# Patient Record
Sex: MALE | Race: ASIAN | NOT HISPANIC OR LATINO
[De-identification: names, ages, dates, MRNs, and addresses within clinical notes are randomized per-mention and may not be internally consistent; named-entity substitution may affect disease eponyms.]

---

## 2017-04-14 ENCOUNTER — NON-APPOINTMENT (OUTPATIENT)
Age: 82
End: 2017-04-14

## 2017-04-14 ENCOUNTER — APPOINTMENT (OUTPATIENT)
Dept: ELECTROPHYSIOLOGY | Facility: CLINIC | Age: 82
End: 2017-04-14

## 2017-04-14 VITALS — SYSTOLIC BLOOD PRESSURE: 152 MMHG | DIASTOLIC BLOOD PRESSURE: 67 MMHG | HEART RATE: 78 BPM

## 2017-05-12 ENCOUNTER — OUTPATIENT (OUTPATIENT)
Dept: OUTPATIENT SERVICES | Facility: HOSPITAL | Age: 82
LOS: 1 days | End: 2017-05-12
Payer: MEDICARE

## 2017-05-12 PROCEDURE — 75574 CT ANGIO HRT W/3D IMAGE: CPT | Mod: 26

## 2017-05-12 PROCEDURE — 75574 CT ANGIO HRT W/3D IMAGE: CPT

## 2017-07-28 ENCOUNTER — APPOINTMENT (OUTPATIENT)
Dept: ELECTROPHYSIOLOGY | Facility: CLINIC | Age: 82
End: 2017-07-28

## 2017-11-03 ENCOUNTER — NON-APPOINTMENT (OUTPATIENT)
Age: 82
End: 2017-11-03

## 2017-11-03 ENCOUNTER — APPOINTMENT (OUTPATIENT)
Dept: ELECTROPHYSIOLOGY | Facility: CLINIC | Age: 82
End: 2017-11-03
Payer: MEDICARE

## 2017-11-03 VITALS — HEART RATE: 60 BPM | SYSTOLIC BLOOD PRESSURE: 134 MMHG | DIASTOLIC BLOOD PRESSURE: 79 MMHG | OXYGEN SATURATION: 94 %

## 2017-11-03 PROCEDURE — 93280 PM DEVICE PROGR EVAL DUAL: CPT

## 2018-01-06 ENCOUNTER — INPATIENT (INPATIENT)
Facility: HOSPITAL | Age: 83
LOS: 4 days | Discharge: HOME HEALTH | DRG: 287 | End: 2018-01-11
Attending: INTERNAL MEDICINE | Admitting: INTERNAL MEDICINE
Payer: MEDICARE

## 2018-01-06 VITALS
SYSTOLIC BLOOD PRESSURE: 159 MMHG | OXYGEN SATURATION: 97 % | RESPIRATION RATE: 18 BRPM | HEART RATE: 85 BPM | HEIGHT: 68 IN | TEMPERATURE: 98 F | WEIGHT: 214.95 LBS | DIASTOLIC BLOOD PRESSURE: 82 MMHG

## 2018-01-06 DIAGNOSIS — I10 ESSENTIAL (PRIMARY) HYPERTENSION: ICD-10-CM

## 2018-01-06 DIAGNOSIS — R63.8 OTHER SYMPTOMS AND SIGNS CONCERNING FOOD AND FLUID INTAKE: ICD-10-CM

## 2018-01-06 DIAGNOSIS — R74.8 ABNORMAL LEVELS OF OTHER SERUM ENZYMES: ICD-10-CM

## 2018-01-06 DIAGNOSIS — F32.9 MAJOR DEPRESSIVE DISORDER, SINGLE EPISODE, UNSPECIFIED: ICD-10-CM

## 2018-01-06 DIAGNOSIS — Z29.9 ENCOUNTER FOR PROPHYLACTIC MEASURES, UNSPECIFIED: ICD-10-CM

## 2018-01-06 DIAGNOSIS — I50.9 HEART FAILURE, UNSPECIFIED: ICD-10-CM

## 2018-01-06 DIAGNOSIS — I48.91 UNSPECIFIED ATRIAL FIBRILLATION: ICD-10-CM

## 2018-01-06 DIAGNOSIS — I27.20 PULMONARY HYPERTENSION, UNSPECIFIED: ICD-10-CM

## 2018-01-06 DIAGNOSIS — E78.5 HYPERLIPIDEMIA, UNSPECIFIED: ICD-10-CM

## 2018-01-06 DIAGNOSIS — E11.9 TYPE 2 DIABETES MELLITUS WITHOUT COMPLICATIONS: ICD-10-CM

## 2018-01-06 LAB
ALBUMIN SERPL ELPH-MCNC: 4.5 G/DL — SIGNIFICANT CHANGE UP (ref 3.3–5)
ALP SERPL-CCNC: 43 U/L — SIGNIFICANT CHANGE UP (ref 40–120)
ALT FLD-CCNC: 12 U/L — SIGNIFICANT CHANGE UP (ref 10–45)
ANION GAP SERPL CALC-SCNC: 13 MMOL/L — SIGNIFICANT CHANGE UP (ref 5–17)
APTT BLD: 40.6 SEC — HIGH (ref 27.5–37.4)
AST SERPL-CCNC: 25 U/L — SIGNIFICANT CHANGE UP (ref 10–40)
BASOPHILS NFR BLD AUTO: 0.2 % — SIGNIFICANT CHANGE UP (ref 0–2)
BILIRUB SERPL-MCNC: 0.7 MG/DL — SIGNIFICANT CHANGE UP (ref 0.2–1.2)
BUN SERPL-MCNC: 19 MG/DL — SIGNIFICANT CHANGE UP (ref 7–23)
CALCIUM SERPL-MCNC: 9.3 MG/DL — SIGNIFICANT CHANGE UP (ref 8.4–10.5)
CHLORIDE SERPL-SCNC: 102 MMOL/L — SIGNIFICANT CHANGE UP (ref 96–108)
CO2 SERPL-SCNC: 26 MMOL/L — SIGNIFICANT CHANGE UP (ref 22–31)
CREAT SERPL-MCNC: 1.09 MG/DL — SIGNIFICANT CHANGE UP (ref 0.5–1.3)
EOSINOPHIL NFR BLD AUTO: 1 % — SIGNIFICANT CHANGE UP (ref 0–6)
GLUCOSE SERPL-MCNC: 118 MG/DL — HIGH (ref 70–99)
HCT VFR BLD CALC: 44.5 % — SIGNIFICANT CHANGE UP (ref 39–50)
HGB BLD-MCNC: 14.5 G/DL — SIGNIFICANT CHANGE UP (ref 13–17)
INR BLD: 1.56 — HIGH (ref 0.88–1.16)
LYMPHOCYTES # BLD AUTO: 19.4 % — SIGNIFICANT CHANGE UP (ref 13–44)
MCHC RBC-ENTMCNC: 30.6 PG — SIGNIFICANT CHANGE UP (ref 27–34)
MCHC RBC-ENTMCNC: 32.6 G/DL — SIGNIFICANT CHANGE UP (ref 32–36)
MCV RBC AUTO: 93.9 FL — SIGNIFICANT CHANGE UP (ref 80–100)
MONOCYTES NFR BLD AUTO: 11.7 % — SIGNIFICANT CHANGE UP (ref 2–14)
NEUTROPHILS NFR BLD AUTO: 67.7 % — SIGNIFICANT CHANGE UP (ref 43–77)
NT-PROBNP SERPL-SCNC: 777 PG/ML — HIGH (ref 0–300)
PLATELET # BLD AUTO: 147 K/UL — LOW (ref 150–400)
POTASSIUM SERPL-MCNC: 5.3 MMOL/L — SIGNIFICANT CHANGE UP (ref 3.5–5.3)
POTASSIUM SERPL-SCNC: 5.3 MMOL/L — SIGNIFICANT CHANGE UP (ref 3.5–5.3)
PROT SERPL-MCNC: 7.8 G/DL — SIGNIFICANT CHANGE UP (ref 6–8.3)
PROTHROM AB SERPL-ACNC: 17.5 SEC — HIGH (ref 9.8–12.7)
RBC # BLD: 4.74 M/UL — SIGNIFICANT CHANGE UP (ref 4.2–5.8)
RBC # FLD: 14.2 % — SIGNIFICANT CHANGE UP (ref 10.3–16.9)
SODIUM SERPL-SCNC: 141 MMOL/L — SIGNIFICANT CHANGE UP (ref 135–145)
WBC # BLD: 5.1 K/UL — SIGNIFICANT CHANGE UP (ref 3.8–10.5)
WBC # FLD AUTO: 5.1 K/UL — SIGNIFICANT CHANGE UP (ref 3.8–10.5)

## 2018-01-06 PROCEDURE — 99285 EMERGENCY DEPT VISIT HI MDM: CPT | Mod: 25

## 2018-01-06 PROCEDURE — 71045 X-RAY EXAM CHEST 1 VIEW: CPT | Mod: 26

## 2018-01-06 PROCEDURE — 93010 ELECTROCARDIOGRAM REPORT: CPT

## 2018-01-06 RX ORDER — METOPROLOL TARTRATE 50 MG
1 TABLET ORAL
Qty: 0 | Refills: 0 | COMMUNITY

## 2018-01-06 RX ORDER — ATORVASTATIN CALCIUM 80 MG/1
10 TABLET, FILM COATED ORAL AT BEDTIME
Qty: 0 | Refills: 0 | Status: DISCONTINUED | OUTPATIENT
Start: 2018-01-06 | End: 2018-01-11

## 2018-01-06 RX ORDER — FUROSEMIDE 40 MG
40 TABLET ORAL DAILY
Qty: 0 | Refills: 0 | Status: DISCONTINUED | OUTPATIENT
Start: 2018-01-07 | End: 2018-01-09

## 2018-01-06 RX ORDER — FINASTERIDE 5 MG/1
5 TABLET, FILM COATED ORAL DAILY
Qty: 0 | Refills: 0 | Status: DISCONTINUED | OUTPATIENT
Start: 2018-01-06 | End: 2018-01-11

## 2018-01-06 RX ORDER — INSULIN LISPRO 100/ML
VIAL (ML) SUBCUTANEOUS
Qty: 0 | Refills: 0 | Status: DISCONTINUED | OUTPATIENT
Start: 2018-01-06 | End: 2018-01-11

## 2018-01-06 RX ORDER — TRAZODONE HCL 50 MG
50 TABLET ORAL AT BEDTIME
Qty: 0 | Refills: 0 | Status: DISCONTINUED | OUTPATIENT
Start: 2018-01-06 | End: 2018-01-11

## 2018-01-06 RX ORDER — RIVAROXABAN 15 MG-20MG
20 KIT ORAL EVERY 24 HOURS
Qty: 0 | Refills: 0 | Status: DISCONTINUED | OUTPATIENT
Start: 2018-01-07 | End: 2018-01-09

## 2018-01-06 RX ORDER — BUDESONIDE AND FORMOTEROL FUMARATE DIHYDRATE 160; 4.5 UG/1; UG/1
2 AEROSOL RESPIRATORY (INHALATION)
Qty: 0 | Refills: 0 | Status: DISCONTINUED | OUTPATIENT
Start: 2018-01-06 | End: 2018-01-11

## 2018-01-06 RX ORDER — METOPROLOL TARTRATE 50 MG
25 TABLET ORAL DAILY
Qty: 0 | Refills: 0 | Status: DISCONTINUED | OUTPATIENT
Start: 2018-01-06 | End: 2018-01-11

## 2018-01-06 RX ORDER — DEXTROSE 50 % IN WATER 50 %
25 SYRINGE (ML) INTRAVENOUS ONCE
Qty: 0 | Refills: 0 | Status: DISCONTINUED | OUTPATIENT
Start: 2018-01-06 | End: 2018-01-11

## 2018-01-06 RX ORDER — SODIUM CHLORIDE 9 MG/ML
1000 INJECTION, SOLUTION INTRAVENOUS
Qty: 0 | Refills: 0 | Status: DISCONTINUED | OUTPATIENT
Start: 2018-01-06 | End: 2018-01-11

## 2018-01-06 RX ORDER — ESCITALOPRAM OXALATE 10 MG/1
20 TABLET, FILM COATED ORAL DAILY
Qty: 0 | Refills: 0 | Status: DISCONTINUED | OUTPATIENT
Start: 2018-01-06 | End: 2018-01-11

## 2018-01-06 RX ORDER — TAMSULOSIN HYDROCHLORIDE 0.4 MG/1
0.4 CAPSULE ORAL AT BEDTIME
Qty: 0 | Refills: 0 | Status: DISCONTINUED | OUTPATIENT
Start: 2018-01-06 | End: 2018-01-11

## 2018-01-06 RX ORDER — MONTELUKAST 4 MG/1
10 TABLET, CHEWABLE ORAL DAILY
Qty: 0 | Refills: 0 | Status: DISCONTINUED | OUTPATIENT
Start: 2018-01-06 | End: 2018-01-11

## 2018-01-06 RX ORDER — GLUCAGON INJECTION, SOLUTION 0.5 MG/.1ML
1 INJECTION, SOLUTION SUBCUTANEOUS ONCE
Qty: 0 | Refills: 0 | Status: DISCONTINUED | OUTPATIENT
Start: 2018-01-06 | End: 2018-01-11

## 2018-01-06 RX ORDER — GEMFIBROZIL 600 MG
600 TABLET ORAL DAILY
Qty: 0 | Refills: 0 | Status: DISCONTINUED | OUTPATIENT
Start: 2018-01-06 | End: 2018-01-11

## 2018-01-06 RX ORDER — FUROSEMIDE 40 MG
40 TABLET ORAL ONCE
Qty: 0 | Refills: 0 | Status: COMPLETED | OUTPATIENT
Start: 2018-01-06 | End: 2018-01-06

## 2018-01-06 RX ORDER — LATANOPROST 0.05 MG/ML
1 SOLUTION/ DROPS OPHTHALMIC; TOPICAL AT BEDTIME
Qty: 0 | Refills: 0 | Status: DISCONTINUED | OUTPATIENT
Start: 2018-01-06 | End: 2018-01-11

## 2018-01-06 RX ORDER — DEXTROSE 50 % IN WATER 50 %
12.5 SYRINGE (ML) INTRAVENOUS ONCE
Qty: 0 | Refills: 0 | Status: DISCONTINUED | OUTPATIENT
Start: 2018-01-06 | End: 2018-01-11

## 2018-01-06 RX ORDER — DEXTROSE 50 % IN WATER 50 %
1 SYRINGE (ML) INTRAVENOUS ONCE
Qty: 0 | Refills: 0 | Status: DISCONTINUED | OUTPATIENT
Start: 2018-01-06 | End: 2018-01-11

## 2018-01-06 RX ADMIN — LATANOPROST 1 DROP(S): 0.05 SOLUTION/ DROPS OPHTHALMIC; TOPICAL at 22:30

## 2018-01-06 RX ADMIN — Medication 50 MILLIGRAM(S): at 22:10

## 2018-01-06 RX ADMIN — TAMSULOSIN HYDROCHLORIDE 0.4 MILLIGRAM(S): 0.4 CAPSULE ORAL at 22:10

## 2018-01-06 RX ADMIN — ATORVASTATIN CALCIUM 10 MILLIGRAM(S): 80 TABLET, FILM COATED ORAL at 22:10

## 2018-01-06 RX ADMIN — Medication 40 MILLIGRAM(S): at 18:33

## 2018-01-06 NOTE — H&P ADULT - PROBLEM SELECTOR PLAN 3
Pt with severe pulmonary HTN as per outpatient notes. Takes Letairis 5mg PO daily at home which is not available on formulary and patient does not have the medication with him. F/u with Dr. Joseph regarding possibly starting sildenafil 20mg TID for patient during admission.  - f/u TTE Troponin at 0.02 on admission, EKG showing no ST changes. Likely 2/2 acute on chronic CHF.  - continue to trend

## 2018-01-06 NOTE — H&P ADULT - PROBLEM SELECTOR PLAN 1
Pt with known CHF, on alternating Lasix 40mg/20mg PO daily at home, s/p IV Lasix 40mg in ED. As per outpatient notes, pt with EF on CTA coronary showing LVEF of 65%.   - strict ins and outs  - daily weight  - holding home PO Lasix, IV Lasix 40mg daily  - TTE ordered Pt with known CHF, on alternating Lasix 40mg/20mg PO daily at home, s/p IV Lasix 40mg in ED. . No JVD on exam but b/l LE edema and crackles L>R. CXR showing L sided pleural effusion. As per outpatient notes, pt with EF on CTA coronary showing LVEF of 65%.   - strict ins and outs  - daily weight  - holding home PO Lasix  - IVP Lasix 40mg daily  - TTE ordered

## 2018-01-06 NOTE — H&P ADULT - ASSESSMENT
81yo Mandarin-speaking male with DM, CHF, Afib on Xarelto, dual-chamber PPM, HTN, HLD, pHTN, depression, brain tumor presenting with worsening RIBEIRO and LE edema admitted for acute on chronic CHF. 83yo Mandarin-speaking male with DM, CHF, Afib on Xarelto, dual-chamber PPM, HTN, HLD, pHTN, depression, brain mass presenting with worsening RIBEIRO and LE edema admitted for acute on chronic CHF.

## 2018-01-06 NOTE — ED ADULT NURSE NOTE - PMH
Atrial Fibrillation    Depression    Diabetes    GERD (Gastroesophageal Reflux Disease)    Hyperlipidemia    Hypertension

## 2018-01-06 NOTE — H&P ADULT - NSHPLABSRESULTS_GEN_ALL_CORE
.  LABS:                         14.5   5.1   )-----------( 147      ( 06 Jan 2018 18:23 )             44.5     01-06    141  |  102  |  19  ----------------------------<  118<H>  5.3   |  26  |  1.09    Ca    9.3      06 Jan 2018 18:23    TPro  7.8  /  Alb  4.5  /  TBili  0.7  /  DBili  x   /  AST  25  /  ALT  12  /  AlkPhos  43  01-06    PT/INR - ( 06 Jan 2018 18:23 )   PT: 17.5 sec;   INR: 1.56          PTT - ( 06 Jan 2018 18:23 )  PTT:40.6 sec    CARDIAC MARKERS ( 06 Jan 2018 18:23 )  x     / 0.02 ng/mL / x     / x     / x          Serum Pro-Brain Natriuretic Peptide: 777 pg/mL (01-06 @ 18:23)        RADIOLOGY, EKG & ADDITIONAL TESTS: Reviewed.

## 2018-01-06 NOTE — H&P ADULT - PROBLEM SELECTOR PLAN 2
Pt with atrial fibrillation on Xarelto 20mg daily, metoprolol succinate 25mg PO daily  - c/w home Xarelto 20mg daily  - c/w home metoprolol succinate 25mg PO daily

## 2018-01-06 NOTE — H&P ADULT - PROBLEM SELECTOR PLAN 4
As per outpatient records, A1c 6.7 (08/2017). Holding home Philluvia, glimepiride  - MISS  - f/u A1c   - monitor FSG Pt with severe pulmonary HTN as per outpatient notes. Takes Letairis 5mg PO daily at home which is not available on formulary and patient does not have the medication with him. F/u with Dr. Joseph regarding possibly starting sildenafil 20mg TID for patient during admission.  - f/u TTE

## 2018-01-06 NOTE — H&P ADULT - NSHPPHYSICALEXAM_GEN_ALL_CORE
.  VITAL SIGNS:  T(C): 36.6 (01-06-18 @ 21:02), Max: 36.6 (01-06-18 @ 20:25)  T(F): 97.8 (01-06-18 @ 21:02), Max: 97.8 (01-06-18 @ 20:25)  HR: 82 (01-06-18 @ 21:02) (68 - 88)  BP: 150/71 (01-06-18 @ 21:02) (132/75 - 159/82)  BP(mean): 101 (01-06-18 @ 21:02) (101 - 101)  RR: 20 (01-06-18 @ 21:02) (18 - 20)  SpO2: 95% (01-06-18 @ 20:25) (91% - 100%)  Wt(kg): --    PHYSICAL EXAM:    Constitutional: lying in bed with bed head up about 20 degrees, comfortable on NC  Head: NC/AT  Eyes: PERRL, EOMI, anicteric sclera  ENT: no nasal discharge; uvula midline, no oropharyngeal erythema or exudates; MMM  Neck: supple; no JVD   Respiratory: minimal crackles on left, clear R lung; no W/R/R, no retractions  Cardiac: +systolic murmur; RRR; PMI non-displaced  Gastrointestinal: abdomen soft, slightly distended, nontender; no rebound or guarding; +BSx4  Extremities: slightly cool feet to touch; no clubbing or cyanosis; 3+ pitting edema to thighs  Musculoskeletal: NROM x4; no joint swelling, tenderness or erythema  Vascular: 2+ radial, DP/PT pulses B/L  Dermatologic: skin warm, dry and intact  Neurologic: AAOx3; CNII-XII grossly intact; no focal deficits  Psychiatric: affect and characteristics of appearance, verbalizations, behaviors are appropriate

## 2018-01-06 NOTE — ED PROVIDER NOTE - MEDICAL DECISION MAKING DETAILS
+ CHF exacerbation, initiated diuresis, no hypoxia or increased work of breathing necessitating respiratory assistance.

## 2018-01-06 NOTE — H&P ADULT - PROBLEM SELECTOR PLAN 7
- c/w home Lexapro 20mg daily - c/w atorvastatin 10mg daily  - c/w gemfibrozil 600mg daily  - f/u lipid panel

## 2018-01-06 NOTE — PATIENT PROFILE ADULT. - TEACHING/LEARNING LEARNING PREFERENCES
verbal instruction/individual instruction verbal instruction/written material/individual instruction

## 2018-01-06 NOTE — ED PROVIDER NOTE - OBJECTIVE STATEMENT
81 y/o M w/CHF, non-obstructive CAD, hx MI w/pacemaker, HTN, HLD, NIDDM, on Xarelto, Metoprolol, and lasix (alt 40mg/20mg every other day), p/w worsening LE swelling b/l and orthopnea over past 2 weeks, no chest pain but + SOB w/exertion, worsening exercise tolerance. Seen today by Cardiologist Dr. Oneil Joseph, referred to ED for CHF exacerbation, diuresis, and further advanced cardiac testing. No fever/chills.

## 2018-01-06 NOTE — H&P ADULT - PROBLEM SELECTOR PLAN 6
- c/w atorvastatin 10mg daily  - c/w gemfibrozil 600mg daily  - f/u lipid panel - c/w home Lasix and metoprolol succinate

## 2018-01-06 NOTE — H&P ADULT - PROBLEM SELECTOR PLAN 5
- c/w home Lasix and metoprolol succinate As per outpatient records, A1c 6.7 (08/2017). Holding home Philluvia, glimepiride  - MISS  - f/u A1c   - monitor FSG

## 2018-01-06 NOTE — H&P ADULT - HISTORY OF PRESENT ILLNESS
82M Mandarin-speaking with PMH CHF (on Lasix alternating 40mg/20mg every other day), atrial fibrillation on Xarelto, nonobstructive CAD, h/o MI, dual-chamber PPM ([employer] placed 05/2010), HTN, HLD, DM presenting from Dr. Joseph's outpatient office today with worsening LE edema, orthopnea, and dyspnea with exertion x 3 months. Pt states these symptoms began 3 months ago but has been worsening over the last month. Endorses occasional coughs productive of whitish sputum. Denies chest pain, fevers, chills, headaches, abdominal pain, dysuria. no chest pain but + SOB w/exertion, worsening exercise tolerance.    ED Vitals: T97.5F, HR 85, /82, RR 18, O2 97% on RA  Labs: INR 1.56, Trop 0.02,  82M Mandarin-speaking with PMH CHF (on Lasix alternating 40mg/20mg every other day), atrial fibrillation on Xarelto, nonobstructive CAD, h/o MI, dual-chamber PPM ([employer] placed 05/2010), HTN, HLD, DM, brain mass being monitored (5 years) presenting from Dr. Joseph's outpatient office today with worsening LE edema, orthopnea, and dyspnea with exertion x 3 months. Pt states these symptoms began 3 months ago but has been worsening over the last month. Endorses occasional coughs productive of whitish sputum. Denies chest pain, fevers, chills, headaches, abdominal pain, dysuria. no chest pain but + SOB w/exertion, worsening exercise tolerance.    ED Vitals: T97.5F, HR 85, /82, RR 18, O2 97% on RA  Labs: INR 1.56, Trop 0.02,     In ED, given IVP Lasix 50mg x1.

## 2018-01-06 NOTE — ED ADULT TRIAGE NOTE - CHIEF COMPLAINT QUOTE
patient c/o sob and bilateral leg swelling for 3 months . Denies any chest pain . Was sent by PMD for evaluation .

## 2018-01-06 NOTE — H&P ADULT - PMH
Atrial Fibrillation    Depression    Diabetes    GERD (Gastroesophageal Reflux Disease)    Hyperlipidemia    Hypertension Atrial Fibrillation    Brain mass    Depression    Diabetes    GERD (Gastroesophageal Reflux Disease)    Hyperlipidemia    Hypertension

## 2018-01-06 NOTE — ED ADULT NURSE NOTE - OBJECTIVE STATEMENT
82 year old male patient, A+OX3, ambulatory w steady gait.  Sent by PMD for CHF exacerbation.  Worsening SOB & orthopnea over the last 2 months.  PAcemaker noted to L upper chest wall.  Denies chest pain.  Dyspnea on exertion.  Pt placed on CM, no distress noted.  Pitting edema noted to bilateral legs.

## 2018-01-06 NOTE — ED PROVIDER NOTE - PHYSICAL EXAMINATION
VITAL SIGNS: I have reviewed nursing notes and confirm.  CONSTITUTIONAL: Well-developed; well-nourished elderly man comfortable in chair, conversational, following commands appropriately, in no acute distress.  SKIN: Agree with RN documentation regarding decubitus evaluation. Remainder of skin exam is warm and dry, no acute rash.  HEAD: Normocephalic; atraumatic.  EYES: PERRL, EOM intact; conjunctiva and sclera clear.  ENT: No nasal discharge; airway clear.  NECK: Supple; non tender.  CARD: S1, S2 normal; no murmurs, gallops, or rubs. RRR  RESP: + bibasilar rales, no inc wob or tachpynea, good excursion  ABD: Normal bowel sounds; soft; non-distended; non-tender  EXT: Normal ROM. + 2+ pitting edema to mid-calf b/l, non-ttp, no erythema/skin breakdown or ecchymoses. Symmetric appearance LE's  LYMPH: No acute cervical adenopathy.  NEURO: Alert, oriented. Grossly unremarkable.  PSYCH: Cooperative, appropriate.

## 2018-01-06 NOTE — ED ADULT NURSE NOTE - CHPI ED SYMPTOMS NEG
no chest pain/no cough/no vomiting/no fever/no chills/no nausea/no syncope/no diaphoresis/no back pain/no dizziness

## 2018-01-07 DIAGNOSIS — E11.9 TYPE 2 DIABETES MELLITUS WITHOUT COMPLICATIONS: ICD-10-CM

## 2018-01-07 LAB
ANION GAP SERPL CALC-SCNC: 12 MMOL/L — SIGNIFICANT CHANGE UP (ref 5–17)
BUN SERPL-MCNC: 19 MG/DL — SIGNIFICANT CHANGE UP (ref 7–23)
CALCIUM SERPL-MCNC: 9.2 MG/DL — SIGNIFICANT CHANGE UP (ref 8.4–10.5)
CHLORIDE SERPL-SCNC: 102 MMOL/L — SIGNIFICANT CHANGE UP (ref 96–108)
CO2 SERPL-SCNC: 31 MMOL/L — SIGNIFICANT CHANGE UP (ref 22–31)
CREAT SERPL-MCNC: 1.12 MG/DL — SIGNIFICANT CHANGE UP (ref 0.5–1.3)
GLUCOSE SERPL-MCNC: 130 MG/DL — HIGH (ref 70–99)
HBA1C BLD-MCNC: 6.2 % — HIGH (ref 4–5.6)
HCT VFR BLD CALC: 43.8 % — SIGNIFICANT CHANGE UP (ref 39–50)
HGB BLD-MCNC: 14 G/DL — SIGNIFICANT CHANGE UP (ref 13–17)
MAGNESIUM SERPL-MCNC: 2 MG/DL — SIGNIFICANT CHANGE UP (ref 1.6–2.6)
MCHC RBC-ENTMCNC: 30 PG — SIGNIFICANT CHANGE UP (ref 27–34)
MCHC RBC-ENTMCNC: 32 G/DL — SIGNIFICANT CHANGE UP (ref 32–36)
MCV RBC AUTO: 94 FL — SIGNIFICANT CHANGE UP (ref 80–100)
PHOSPHATE SERPL-MCNC: 4.5 MG/DL — SIGNIFICANT CHANGE UP (ref 2.5–4.5)
PLATELET # BLD AUTO: 138 K/UL — LOW (ref 150–400)
POTASSIUM SERPL-MCNC: 4 MMOL/L — SIGNIFICANT CHANGE UP (ref 3.5–5.3)
POTASSIUM SERPL-SCNC: 4 MMOL/L — SIGNIFICANT CHANGE UP (ref 3.5–5.3)
RBC # BLD: 4.66 M/UL — SIGNIFICANT CHANGE UP (ref 4.2–5.8)
RBC # FLD: 14.2 % — SIGNIFICANT CHANGE UP (ref 10.3–16.9)
SODIUM SERPL-SCNC: 145 MMOL/L — SIGNIFICANT CHANGE UP (ref 135–145)
TROPONIN T SERPL-MCNC: 0.02 NG/ML — HIGH (ref 0–0.01)
TROPONIN T SERPL-MCNC: 0.03 NG/ML — HIGH (ref 0–0.01)
WBC # BLD: 4.3 K/UL — SIGNIFICANT CHANGE UP (ref 3.8–10.5)
WBC # FLD AUTO: 4.3 K/UL — SIGNIFICANT CHANGE UP (ref 3.8–10.5)

## 2018-01-07 RX ORDER — ICOSAPENT ETHYL 500 MG/1
2 CAPSULE, LIQUID FILLED ORAL
Qty: 0 | Refills: 0 | COMMUNITY

## 2018-01-07 RX ORDER — AMBRISENTAN 10 MG/1
1 TABLET, FILM COATED ORAL
Qty: 0 | Refills: 0 | COMMUNITY

## 2018-01-07 RX ORDER — RIVAROXABAN 15 MG-20MG
1 KIT ORAL
Qty: 0 | Refills: 0 | COMMUNITY

## 2018-01-07 RX ORDER — MONTELUKAST 4 MG/1
1 TABLET, CHEWABLE ORAL
Qty: 0 | Refills: 0 | COMMUNITY

## 2018-01-07 RX ORDER — GLIMEPIRIDE 1 MG
1 TABLET ORAL
Qty: 0 | Refills: 0 | COMMUNITY

## 2018-01-07 RX ORDER — GEMFIBROZIL 600 MG
0 TABLET ORAL
Qty: 0 | Refills: 0 | COMMUNITY

## 2018-01-07 RX ORDER — METOPROLOL TARTRATE 50 MG
1 TABLET ORAL
Qty: 0 | Refills: 0 | COMMUNITY

## 2018-01-07 RX ORDER — TRAZODONE HCL 50 MG
1 TABLET ORAL
Qty: 0 | Refills: 0 | COMMUNITY

## 2018-01-07 RX ORDER — BUDESONIDE AND FORMOTEROL FUMARATE DIHYDRATE 160; 4.5 UG/1; UG/1
0 AEROSOL RESPIRATORY (INHALATION)
Qty: 0 | Refills: 0 | COMMUNITY

## 2018-01-07 RX ORDER — POTASSIUM CHLORIDE 20 MEQ
1 PACKET (EA) ORAL
Qty: 0 | Refills: 0 | COMMUNITY

## 2018-01-07 RX ORDER — SITAGLIPTIN 50 MG/1
1 TABLET, FILM COATED ORAL
Qty: 0 | Refills: 0 | COMMUNITY

## 2018-01-07 RX ORDER — ATORVASTATIN CALCIUM 80 MG/1
1 TABLET, FILM COATED ORAL
Qty: 0 | Refills: 0 | COMMUNITY

## 2018-01-07 RX ADMIN — FINASTERIDE 5 MILLIGRAM(S): 5 TABLET, FILM COATED ORAL at 11:43

## 2018-01-07 RX ADMIN — TAMSULOSIN HYDROCHLORIDE 0.4 MILLIGRAM(S): 0.4 CAPSULE ORAL at 22:11

## 2018-01-07 RX ADMIN — Medication 25 MILLIGRAM(S): at 05:49

## 2018-01-07 RX ADMIN — Medication 40 MILLIGRAM(S): at 05:49

## 2018-01-07 RX ADMIN — Medication 2: at 18:11

## 2018-01-07 RX ADMIN — Medication 50 MILLIGRAM(S): at 22:11

## 2018-01-07 RX ADMIN — ATORVASTATIN CALCIUM 10 MILLIGRAM(S): 80 TABLET, FILM COATED ORAL at 22:11

## 2018-01-07 RX ADMIN — Medication 600 MILLIGRAM(S): at 11:43

## 2018-01-07 RX ADMIN — BUDESONIDE AND FORMOTEROL FUMARATE DIHYDRATE 2 PUFF(S): 160; 4.5 AEROSOL RESPIRATORY (INHALATION) at 05:49

## 2018-01-07 RX ADMIN — BUDESONIDE AND FORMOTEROL FUMARATE DIHYDRATE 2 PUFF(S): 160; 4.5 AEROSOL RESPIRATORY (INHALATION) at 18:10

## 2018-01-07 RX ADMIN — RIVAROXABAN 20 MILLIGRAM(S): KIT at 18:10

## 2018-01-07 RX ADMIN — LATANOPROST 1 DROP(S): 0.05 SOLUTION/ DROPS OPHTHALMIC; TOPICAL at 22:11

## 2018-01-07 RX ADMIN — ESCITALOPRAM OXALATE 20 MILLIGRAM(S): 10 TABLET, FILM COATED ORAL at 11:43

## 2018-01-07 RX ADMIN — MONTELUKAST 10 MILLIGRAM(S): 4 TABLET, CHEWABLE ORAL at 11:43

## 2018-01-07 NOTE — PROGRESS NOTE ADULT - PROBLEM SELECTOR PLAN 2
Pt with atrial fibrillation on Xarelto 20mg daily, metoprolol succinate 25mg PO daily  - c/w home Xarelto 20mg daily  - c/w home metoprolol succinate 25mg PO daily.

## 2018-01-07 NOTE — PROGRESS NOTE ADULT - PROBLEM SELECTOR PLAN 3
Troponin at 0.02 on admission, EKG showing no ST changes. Likely 2/2 acute on chronic CHF. Increased to 0.03, then downtrended to 0.02 this morning.

## 2018-01-07 NOTE — PROGRESS NOTE ADULT - SUBJECTIVE AND OBJECTIVE BOX
INTERVAL HPI/OVERNIGHT EVENTS: Patient admitted overnight, given Lasix 40mg IVP x1.     Subjective:     VITAL SIGNS:  T(F): 97.1 (01-07-18 @ 09:57)  HR: 60 (01-07-18 @ 05:00)  BP: 119/67 (01-07-18 @ 05:00)  RR: 22 (01-07-18 @ 05:00)  SpO2: 93% (01-07-18 @ 05:00)  Wt(kg): --    I&O's Summary    06 Jan 2018 07:01  -  07 Jan 2018 07:00  --------------------------------------------------------  IN: 450 mL / OUT: 1815 mL / NET: -1365 mL    07 Jan 2018 07:01  -  07 Jan 2018 11:43  --------------------------------------------------------  IN: 210 mL / OUT: 1000 mL / NET: -790 mL        	PHYSICAL EXAM:    Constitutional: lying in bed with bed head up about 20 degrees, comfortable on NC  Head: NC/AT  Eyes: PERRL, EOMI, anicteric sclera  ENT: no nasal discharge; uvula midline, no oropharyngeal erythema or exudates; MMM  Neck: supple; no JVD   Respiratory: minimal crackles on left, clear R lung; no W/R/R, no retractions  Cardiac: +systolic murmur; RRR; PMI non-displaced  Gastrointestinal: abdomen soft, slightly distended, nontender; no rebound or guarding; +BSx4  Extremities: slightly cool feet to touch; no clubbing or cyanosis; 3+ pitting edema to thighs  Musculoskeletal: NROM x4; no joint swelling, tenderness or erythema  Vascular: 2+ radial, DP/PT pulses B/L  Dermatologic: skin warm, dry and intact  Neurologic: AAOx3; CNII-XII grossly intact; no focal deficits  Psychiatric: affect and characteristics of appearance, verbalizations, behaviors are appropriate    MEDICATIONS  (STANDING):  atorvastatin 10 milliGRAM(s) Oral at bedtime  buDESOnide 160 MICROgram(s)/formoterol 4.5 MICROgram(s) Inhaler 2 Puff(s) Inhalation two times a day  dextrose 5%. 1000 milliLiter(s) (50 mL/Hr) IV Continuous <Continuous>  dextrose 50% Injectable 12.5 Gram(s) IV Push once  dextrose 50% Injectable 25 Gram(s) IV Push once  dextrose 50% Injectable 25 Gram(s) IV Push once  escitalopram 20 milliGRAM(s) Oral daily  finasteride 5 milliGRAM(s) Oral daily  furosemide   Injectable 40 milliGRAM(s) IV Push daily  gemfibrozil 600 milliGRAM(s) Oral daily  insulin lispro (HumaLOG) corrective regimen sliding scale   SubCutaneous Before meals and at bedtime  latanoprost 0.005% Ophthalmic Solution 1 Drop(s) Both EYES at bedtime  metoprolol succinate ER 25 milliGRAM(s) Oral daily  montelukast 10 milliGRAM(s) Oral daily  rivaroxaban 20 milliGRAM(s) Oral every 24 hours  tamsulosin 0.4 milliGRAM(s) Oral at bedtime  traZODone 50 milliGRAM(s) Oral at bedtime    MEDICATIONS  (PRN):  dextrose Gel 1 Dose(s) Oral once PRN Blood Glucose LESS THAN 70 milliGRAM(s)/deciliter  glucagon  Injectable 1 milliGRAM(s) IntraMuscular once PRN Glucose LESS THAN 70 milligrams/deciliter      Allergies    No Known Allergies    Intolerances        LABS:                        14.0   4.3   )-----------( 138      ( 07 Jan 2018 07:26 )             43.8     01-07    145  |  102  |  19  ----------------------------<  130<H>  4.0   |  31  |  1.12    Ca    9.2      07 Jan 2018 07:27  Phos  4.5     01-07  Mg     2.0     01-07    TPro  7.8  /  Alb  4.5  /  TBili  0.7  /  DBili  x   /  AST  25  /  ALT  12  /  AlkPhos  43  01-06    PT/INR - ( 06 Jan 2018 18:23 )   PT: 17.5 sec;   INR: 1.56          PTT - ( 06 Jan 2018 18:23 )  PTT:40.6 sec      RADIOLOGY & ADDITIONAL TESTS: reviewed

## 2018-01-07 NOTE — PROGRESS NOTE ADULT - PROBLEM SELECTOR PLAN 5
s per outpatient records, A1c 6.7 (08/2017). On Januvia and glimepiride at home. DM2 well controlled given A1c 6.2 this morning.   - fingersticks QID and PRN with sliding scale coverage  - hold home Januvia and glimepiride while inpatient

## 2018-01-07 NOTE — PROGRESS NOTE ADULT - PROBLEM SELECTOR PLAN 1
Pt with known CHF, on alternating Lasix 40mg/20mg PO daily at home, s/p IV Lasix 40mg in ED. . No JVD on exam but b/l LE edema and crackles L>R. CXR showing L sided pleural effusion. As per outpatient notes, pt with EF on CTA coronary showing LVEF of 65%.   - strict ins and outs  - daily weight  - holding home PO Lasix  - IVP Lasix 40mg daily  - TTE ordered.

## 2018-01-07 NOTE — PROGRESS NOTE ADULT - PROBLEM SELECTOR PLAN 4
Pt with severe pulmonary HTN as per outpatient notes. Takes Letairis 5mg PO daily at home which is not available on formulary and patient does not have the medication with him. F/u with Dr. Joseph regarding possibly starting sildenafil 20mg TID for patient during admission.  - f/u TTE.

## 2018-01-08 ENCOUNTER — TRANSCRIPTION ENCOUNTER (OUTPATIENT)
Age: 83
End: 2018-01-08

## 2018-01-08 LAB
ANION GAP SERPL CALC-SCNC: 9 MMOL/L — SIGNIFICANT CHANGE UP (ref 5–17)
BUN SERPL-MCNC: 24 MG/DL — HIGH (ref 7–23)
CALCIUM SERPL-MCNC: 9.1 MG/DL — SIGNIFICANT CHANGE UP (ref 8.4–10.5)
CHLORIDE SERPL-SCNC: 104 MMOL/L — SIGNIFICANT CHANGE UP (ref 96–108)
CO2 SERPL-SCNC: 32 MMOL/L — HIGH (ref 22–31)
CREAT SERPL-MCNC: 0.98 MG/DL — SIGNIFICANT CHANGE UP (ref 0.5–1.3)
GLUCOSE SERPL-MCNC: 126 MG/DL — HIGH (ref 70–99)
MAGNESIUM SERPL-MCNC: 2.2 MG/DL — SIGNIFICANT CHANGE UP (ref 1.6–2.6)
POTASSIUM SERPL-MCNC: 4 MMOL/L — SIGNIFICANT CHANGE UP (ref 3.5–5.3)
POTASSIUM SERPL-SCNC: 4 MMOL/L — SIGNIFICANT CHANGE UP (ref 3.5–5.3)
RAPID RVP RESULT: SIGNIFICANT CHANGE UP
SODIUM SERPL-SCNC: 145 MMOL/L — SIGNIFICANT CHANGE UP (ref 135–145)

## 2018-01-08 PROCEDURE — 93280 PM DEVICE PROGR EVAL DUAL: CPT | Mod: 26

## 2018-01-08 PROCEDURE — 93306 TTE W/DOPPLER COMPLETE: CPT | Mod: 26

## 2018-01-08 PROCEDURE — 99233 SBSQ HOSP IP/OBS HIGH 50: CPT

## 2018-01-08 RX ORDER — LISINOPRIL 2.5 MG/1
2.5 TABLET ORAL DAILY
Qty: 0 | Refills: 0 | Status: DISCONTINUED | OUTPATIENT
Start: 2018-01-09 | End: 2018-01-11

## 2018-01-08 RX ADMIN — ESCITALOPRAM OXALATE 20 MILLIGRAM(S): 10 TABLET, FILM COATED ORAL at 12:04

## 2018-01-08 RX ADMIN — Medication 50 MILLIGRAM(S): at 21:36

## 2018-01-08 RX ADMIN — Medication 40 MILLIGRAM(S): at 06:05

## 2018-01-08 RX ADMIN — RIVAROXABAN 20 MILLIGRAM(S): KIT at 16:37

## 2018-01-08 RX ADMIN — ATORVASTATIN CALCIUM 10 MILLIGRAM(S): 80 TABLET, FILM COATED ORAL at 21:36

## 2018-01-08 RX ADMIN — LATANOPROST 1 DROP(S): 0.05 SOLUTION/ DROPS OPHTHALMIC; TOPICAL at 21:36

## 2018-01-08 RX ADMIN — FINASTERIDE 5 MILLIGRAM(S): 5 TABLET, FILM COATED ORAL at 12:04

## 2018-01-08 RX ADMIN — TAMSULOSIN HYDROCHLORIDE 0.4 MILLIGRAM(S): 0.4 CAPSULE ORAL at 21:36

## 2018-01-08 RX ADMIN — MONTELUKAST 10 MILLIGRAM(S): 4 TABLET, CHEWABLE ORAL at 12:04

## 2018-01-08 RX ADMIN — Medication 25 MILLIGRAM(S): at 06:05

## 2018-01-08 RX ADMIN — Medication 600 MILLIGRAM(S): at 12:05

## 2018-01-08 NOTE — PROGRESS NOTE ADULT - PROBLEM SELECTOR PLAN 3
Troponin at 0.02 on admission, EKG showing no ST changes. Likely 2/2 acute on chronic CHF. Increased to 0.03, then downtrended to 0.02 this morning. Troponin at 0.02 on admission, peaked at 0.03. Mildly elevated trop likely 2/2 acute on chronic CHF.  -EKG uninterpretable as is Vpaced

## 2018-01-08 NOTE — DISCHARGE NOTE ADULT - CARE PROVIDER_API CALL
Oneil Joseph), Cardiovascular Disease  [facility]  [place] [address]  Phone: [phone]  Fax: [phone] Oneil Joseph), Cardiovascular Disease  [facility]  [place] [address]  Phone: [phone]  Fax: [phone]    Lissette Becker), Critical Care Medicine; Pulmonary Disease  [address] [address]  Phone: [phone]  Fax: [phone]

## 2018-01-08 NOTE — DISCHARGE NOTE ADULT - CARE PLAN
Principal Discharge DX:	Acute on chronic congestive heart failure, unspecified congestive heart failure type  Goal:	Follow up with Dr Joseph in 1-2 weeks  Instructions for follow-up, activity and diet:	You were admitted to the hospital for shortness of breath, bilateral leg swelling worsening over the past few months. You were given intravenous medication Lasix to get rid of the fluid in your lungs and body in order to help you breathe better. Please take Lasix as prescribed without missing doses. You had an echocardiogram that showed you have a weakened heart muscle. The pumping function (Ejection Fraction) of your heart is 40-45%, which is lower compared to echocardiograms performed previously. You underwent a cardiac catheterization on 1/9/18 and was found to have non-obstructive coronary arteries. Please maintain a low salt diet and weigh yourself daily and report any weight gain over 2 pounds/day to your Doctor.  Secondary Diagnosis:	Chronic atrial fibrillation  Instructions for follow-up, activity and diet:	Your blood thinner medication Xarelto was held for 1 day due to your cardiac catheterization procedure. It was resumed after, please take the medication as prescribed.  Secondary Diagnosis:	Pulmonary hypertension  Instructions for follow-up, activity and diet:	Please continue taking medication Letairis 5mg daily as prescribed by Dr Joseph. Please follow up with Dr Joseph in 1-2 weeks in the office. Principal Discharge DX:	Acute on chronic congestive heart failure, unspecified congestive heart failure type  Goal:	Follow up with Dr Joseph in 1-2 weeks  Instructions for follow-up, activity and diet:	You were admitted to the hospital for shortness of breath, bilateral leg swelling worsening over the past few months. You were given intravenous medication Lasix to get rid of the fluid in your lungs and body in order to help you breathe better. Please take Lasix as prescribed without missing doses. You had an echocardiogram that showed you have a weakened heart muscle. The pumping function (Ejection Fraction) of your heart is 40-45%, which is lower compared to echocardiograms performed previously. You were also found to have a leaky Mitral Valve. You underwent a cardiac catheterization on 1/9/18 and was found to have non-obstructive coronary arteries. Please maintain a low salt diet and weigh yourself daily and report any weight gain over 2 pounds/day to your Doctor.  Secondary Diagnosis:	Chronic atrial fibrillation  Instructions for follow-up, activity and diet:	Your blood thinner medication Xarelto was held for 1 day due to your cardiac catheterization procedure. It was resumed after, please take the medication as prescribed.  Secondary Diagnosis:	Pulmonary hypertension  Instructions for follow-up, activity and diet:	Please continue taking medication Letairis 5mg daily as prescribed by Dr Joseph. Please follow up with Dr Joseph in 1-2 weeks in the office. Principal Discharge DX:	Acute on chronic congestive heart failure, unspecified congestive heart failure type  Goal:	Follow up with Dr Joseph in 1-2 weeks  Instructions for follow-up, activity and diet:	You were admitted to the hospital for shortness of breath, bilateral leg swelling worsening over the past few months. You were given intravenous medication Lasix to get rid of the fluid in your lungs and body in order to help you breathe better. Please take your home Lasix dose as prescribed without missing doses (alternating between 40mg and 20mg every other day). You had an echocardiogram that showed you have a weakened heart muscle. The pumping function (Ejection Fraction) of your heart is 40-45%, which is lower compared to echocardiograms performed previously. You were also found to have a leaky Mitral Valve. You underwent a cardiac catheterization on 1/9/18 and was found to have non-obstructive coronary arteries. Please maintain a low salt diet and weigh yourself daily and report any weight gain over 2 pounds/day to your Doctor.  Secondary Diagnosis:	Chronic atrial fibrillation  Instructions for follow-up, activity and diet:	Your blood thinner medication Xarelto was held for 1 day due to your cardiac catheterization procedure. It was resumed after, please take the medication as prescribed.  Secondary Diagnosis:	Pulmonary hypertension  Instructions for follow-up, activity and diet:	Please continue taking medication Letairis 5mg daily as prescribed by Dr Joseph. Please follow up with Dr Joseph in the office next week Principal Discharge DX:	Acute on chronic congestive heart failure, unspecified congestive heart failure type  Goal:	Follow up with Dr Joseph in 1-2 weeks  Instructions for follow-up, activity and diet:	You were admitted to the hospital for shortness of breath, bilateral leg swelling worsening over the past few months. You were given intravenous medication Lasix to get rid of the fluid in your lungs and body in order to help you breathe better. Please take your home Lasix dose as prescribed without missing doses (alternating between 40mg and 20mg every other day). You had an echocardiogram that showed you have a weakened heart muscle. The pumping function (Ejection Fraction) of your heart is 40-45%, which is lower compared to echocardiograms performed previously. You were also found to have a leaky Mitral Valve. You underwent a cardiac catheterization on 1/9/18 and was found to have non-obstructive coronary arteries. You were started on medication Lisinopril 2.5mg once a day to help improve your heart's pumping function. Please maintain a low salt diet and weigh yourself daily and report any weight gain over 2 pounds/day to your Doctor.  Secondary Diagnosis:	Chronic atrial fibrillation  Instructions for follow-up, activity and diet:	Your blood thinner medication Xarelto was held for 1 day due to your cardiac catheterization procedure. It was resumed after, please take the medication as prescribed.  Secondary Diagnosis:	Pulmonary hypertension  Instructions for follow-up, activity and diet:	Please continue taking medication Letairis 5mg daily as prescribed by Dr Joseph. Please follow up with Dr Joseph in the office next week

## 2018-01-08 NOTE — DISCHARGE NOTE ADULT - ADDITIONAL INSTRUCTIONS
1. Please follow up with your Cardiologist Dr Joseph in 1-2 weeks.  Oneil Joseph), Cardiovascular Disease  [facility]  [place] [address]  Phone: [phone] 1. Please follow up with your Cardiologist Dr Joseph next Tuesday on 1/16/18 at 4pm.  Oneil Joseph), Cardiovascular Disease  [facility]  Dept of Cardiology  [address] [place] [address]  Phone: [phone]    2. Please follow up with the Pulmonologist in 2 weeks  Lissette Becker), Critical Care Medicine; Pulmonary Disease  [address] [address]  Phone: [phone]    3. Please follow up with your Primary Care Doctor in 1 week 1. Please follow up with your Cardiologist Dr Joseph next Tuesday on 1/16/18 at 4pm.  Oneil Joseph), Cardiovascular Disease  [facility]  Dept of Cardiology  [address] [place] [address]  Phone: [phone]    2. Please follow up with the Pulmonologist in 2 weeks. Please call to make an appointment.  Lissette Becker), Critical Care Medicine; Pulmonary Disease  [address] [address]  Phone: [phone]    3. Please follow up with your Primary Care Doctor in 1 week.    4. Please follow up with your ophthalmologist for your progressive worsening vision.

## 2018-01-08 NOTE — PROGRESS NOTE ADULT - PROBLEM SELECTOR PLAN 9
F: none  E: monitor and replete electrolytes as needed for K<4, Mg<2  N: DASH, carb-consistent diet F: none  E: monitor and replete electrolytes as needed for K<4, Mg<2  N: DASH/TLC, carb-consistent diet, 1L fluid restriction

## 2018-01-08 NOTE — DISCHARGE NOTE ADULT - CARE PROVIDERS DIRECT ADDRESSES
[email or web address]

## 2018-01-08 NOTE — PROGRESS NOTE ADULT - ASSESSMENT
83yo Mandarin-speaking male with DM, CHF, Afib on Xarelto, dual-chamber PPM, HTN, HLD, pHTN, depression, brain mass presenting with worsening RIBEIRO and LE edema admitted for acute on chronic CHF. 81yo Mandarin-speaking male with CHF (EF 40-45% on echo), Afib on Xarelto, dual-chamber PPM, HTN, HLD, pulm HTN on Letairis, DM, depression, brain mass presenting with worsening RIBEIRO and LE edema admitted for acute on chronic CHF.

## 2018-01-08 NOTE — PROGRESS NOTE ADULT - PROBLEM SELECTOR PLAN 4
Pt with severe pulmonary HTN as per outpatient notes. Takes Letairis 5mg PO daily at home which is not available on formulary and patient does not have the medication with him. F/u with Dr. Joseph regarding possibly starting sildenafil 20mg TID for patient during admission.  - f/u TTE. Pt with severe pulmonary HTN as per outpatient notes. Takes Letairis 5mg PO daily at home which is not available on formulary and patient does not have the medication with him.   -F/u with Dr. Joseph regarding possibly starting sildenafil 20mg TID for patient during admission.  -Echo noted mild pulm HTN, PASP 45 Pt with severe pulmonary HTN as per outpatient notes. Takes Letairis 5mg PO daily at home which is not available on formulary and patient does not have the medication with him.   Family will bring his own PH mediation from home tomorrow.  His PH could be due to left sided heart disease.  Will reevaluate after the R and L heart cath.   -F/u with Dr. Joseph regarding possibly starting sildenafil 20mg TID for patient during admission.  -Echo noted mild pulm HTN, PASP 45

## 2018-01-08 NOTE — PROGRESS NOTE ADULT - SUBJECTIVE AND OBJECTIVE BOX
EPS Device interrogation    Indication: Pt with CHF. Asked for PPM check. Pt with chronic AFIB (on Xarelto)    Device model: [employer] PPM 					    Implanting Physician: Dr. Cesar Marshall at [facility] in 5/26/2010    Functioning Mode: DDIR 60 bpm			    Presenting Rhythm: AFIB with  at 60 bpm.   Underlying Rhythm: AFIB with slow ventricular escape ~ 35-40 bpm.   Atrial pacing 5%  Ventricular pacing 89%     Pacemaker dependency: Yes    Battery status: 3 years left     Lead parameters:   				  RA lead:    Sense:  0.6 mV.              Threshold:   n/a (afib)            Impedance: 420  ohms  RV lead:    Sense:   6.8 mV.              Threshold: 1 V at 0.6 ms.           Impedance:  540 ohms    Events/Alert: Chronic AFIB since 2010.     Parameter change: None.     Normal PPM interrogation.

## 2018-01-08 NOTE — PROGRESS NOTE ADULT - PROBLEM SELECTOR PLAN 2
Pt with atrial fibrillation on Xarelto 20mg daily, metoprolol succinate 25mg PO daily  - c/w home Xarelto 20mg daily  - c/w home metoprolol succinate 25mg PO daily. Pt with atrial fibrillation on Xarelto 20mg daily, metoprolol succinate 25mg PO daily  - c/w home Xarelto 20mg daily  - c/w home metoprolol succinate 25mg PO daily.  - EP consult for PPM interrogation. Pt with atrial fibrillation on Xarelto 20mg daily, metoprolol succinate 25mg PO daily  - c/w home Xarelto 20mg daily  - c/w home metoprolol succinate 25mg PO daily.  - EP consult for PPM interrogation.    Paced rhythm at 60 bpm.   Misinterpretation of the heart rate by the office ECG computer.  May need a faster paced beat for improving cardiac output.

## 2018-01-08 NOTE — PROGRESS NOTE ADULT - PROBLEM SELECTOR PLAN 1
Pt with known CHF, on alternating Lasix 40mg/20mg PO daily at home, s/p IV Lasix 40mg in ED. . No JVD on exam but b/l LE edema and crackles L>R. CXR showing L sided pleural effusion. As per outpatient notes, pt with EF on CTA coronary showing LVEF of 65%.   - strict ins and outs  - daily weight  - holding home PO Lasix  - IVP Lasix 40mg daily  - TTE ordered. Pt with known diastolic CHF (EF 65% on CTA cardiac), on alternating Lasix 40mg/20mg PO daily at home, s/p IV Lasix 40mg in ED. Trop peaked 0.03. . EKG afib w/ V-paced. On exam b/l LE edema, +JVD, and crackles L>R. CXR showing L sided pleural effusion. Etiology of acute systolic CHF exacerbation likely 2/2 URI/viral illness, chronic RV pacing, less likely ishemia.  - Monitor strict I/Os, daily weight  - C/w Lasix 40mg IV (home dose 40mg/20mg alternating qod)  -C/w Metoprolol succinate 25mg qd  - Echo performed w/ EF 40-45%, mild global hypokinesis, mildly dilated LV, severely dilated LA/RA, mod aortic valve thickening, mild AI, mod MR, sev TR, mild pulm HTN, PASP 45.  -CTA Cardiac 5/2017 w/ Calcium score 726, mild disease noted in the LAD and RCA, FFR >0.8.  -Etiology decreased EF possibly 2/2 chronic RV pacing, less likely ischemia as last CTA Cardiac w/ mild disease Pt with known diastolic CHF (EF 65% on CTA cardiac), on alternating Lasix 40mg/20mg PO daily at home, s/p IV Lasix 40mg in ED. Trop peaked 0.03. . EKG afib w/ V-paced. On exam b/l LE edema, +JVD, and crackles L>R. CXR showing L sided pleural effusion. Etiology of acute systolic CHF exacerbation likely 2/2 URI/viral illness, chronic RV pacing, less likely ishemia.  - Monitor strict I/Os, daily weight  - C/w Lasix 40mg IV (home dose 40mg/20mg alternating qod)  -C/w Metoprolol succinate 25mg qd  - Echo performed w/ EF 40-45%, mild global hypokinesis, mildly dilated LV, severely dilated LA/RA, mod aortic valve thickening, mild AI, mod MR, sev TR, mild pulm HTN, PASP 45.  -CTA Cardiac 5/2017 w/ Calcium score 726, mild disease noted in the LAD and RCA, FFR >0.8.  -Etiology decreased EF possibly 2/2 chronic RV pacing, less likely ischemia as last CTA Cardiac w/ mild disease    Due to significant decrease in LVEF from over 60% on CCTA to below 50% now and worsening RIBEIRO and CHF symptoms, recommend R and L heart cath.    Consider ACE inhibitor for new onset CHF.

## 2018-01-08 NOTE — DISCHARGE NOTE ADULT - HOSPITAL COURSE
82M Mandarin-speaking with PMH CHF (EF of 65%) (on Lasix alternating 40mg/20mg every other day), atrial fibrillation on Xarelto, nonobstructive CAD, h/o MI, dual-chamber PPM ([employer] placed 05/2010), HTN, HLD, DM, brain mass being monitored (5 years) presenting from Dr. Joseph's outpatient office today with worsening LE edema, orthopnea, and dyspnea with exertion x 3 months. Pt states these symptoms began 3 months ago but has been worsening over the last month. Endorses occasional coughs productive of whitish sputum. Denies chest pain, fevers, chills, headaches, abdominal pain, dysuria. no chest pain but + SOB w/exertion, worsening exercise tolerance. ED Vitals: T97.5F, HR 85, /82, RR 18, O2 97% on RA. Labs: INR 1.56, Trop 0.02, . CXR w/ pulm congestion, EKG w/ Afib w/ V pacing. Meds given IVP Lasix 40mg x1. 82M Mandarin-speaking with PMH CHF (EF 50% on echo) (on Lasix alternating 40mg/20mg every other day), atrial fibrillation on Xarelto, nonobstructive CAD, dual-chamber PPM ([employer] placed 05/2010), HTN, HLD, DM, brain mass being monitored (5 years) presenting from outpatient cardiologist Dr Joseph's office c/o progressive worsening LE edema, orthopnea, and dyspnea with exertion x 3 months. Pt states these symptoms began 3 months ago but has been worsening over the last month. Endorses occasional coughs productive of whitish sputum. Denies chest pain, fevers, chills, headaches, abdominal pain, dysuria. no chest pain but + SOB w/exertion, worsening exercise tolerance. ED Vitals: T97.5F, HR 85, /82, RR 18, O2 97% on RA. Labs: INR 1.56, Trop 0.02, . CXR w/ pulm congestion, EKG w/ Afib w/ V pacing. Meds given IVP Lasix 40mg x1.  He was admitted to [facility] for acute systolic CHF exacerbation likely 2/2 URI vs chronic RV pacing, ruled out ischemia. He received IV diuresis and was later transitioned to PO w/ improvement of hi symptoms. Echo was performed noted EF 40-45%, mild global hypokinesis, mildly dilated LV, severely dilated LA/RA, mod aortic valve thickening, mild AI, mod MR, sev TR, mild pulm HTN, PASP 45. Outpt echo 3/2017 report noted EF 50%, LA/LV mod dilated, mild MR. RVP negative. He underwent L/RHC on 1/9/18 in light of acutely depressed LVEF w/ findings of nonobstructive CAD (LAD <30% stenosis), RA 12, RV 40/12, PA 40/16, PCWP 16, PA sat 60, Ao sat 80. Started Lisinopril 2.5mg qd for acute systolic CHF. EP interrogated PPM noted Vpaced 89%, PPM pacing rate was increased to 70bpm per Dr Joseph to increase CO. Pt reported dizziness x few months, CT Head performed ... 82M Mandarin-speaking with PMH CHF (EF 50% on echo) (on Lasix alternating 40mg/20mg every other day), atrial fibrillation on Xarelto, nonobstructive CAD, dual-chamber PPM ([employer] placed 05/2010), HTN, HLD, DM, brain mass being monitored (5 years) presenting from outpatient cardiologist Dr Joseph's office c/o progressive worsening LE edema, orthopnea, and dyspnea with exertion x 3 months. Pt states these symptoms began 3 months ago but has been worsening over the last month. Endorses occasional coughs productive of whitish sputum. Denies chest pain, fevers, chills, headaches, abdominal pain, dysuria. no chest pain but + SOB w/exertion, worsening exercise tolerance. In ED Vitals: T97.5F, HR 85, /82, RR 18, O2 97% on RA. Labs: INR 1.56, Trop 0.02, . CXR w/ pulm congestion, EKG w/ Afib w/ V pacing. Meds given IVP Lasix 40mg x1.  He was admitted to tele [facility] for acute systolic CHF exacerbation likely 2/2 URI vs chronic RV pacing. Ruled out ischemia via LHC revealing nonobstructive CAD. He received IV diuresis and was later transitioned to PO w/ improvement of his symptoms. Echo was performed noted EF 40-45%, mild global hypokinesis, mildly dilated LV, severely dilated LA/RA, mod aortic valve thickening, mild AI, mod MR, sev TR, mild pulm HTN, PASP 45. Outpt echo 3/2017 report noted EF 50%, LA/LV mod dilated, mild MR. RVP negative. He underwent L/RHC on 1/9/18 in light of acutely depressed LVEF w/ findings of nonobstructive CAD (LAD <30% stenosis), RA 12, RV 40/12, PA 40/16, PCWP 16, PA sat 60, Ao sat 80. Started Lisinopril 2.5mg qd for acute systolic CHF. EP interrogated PPM noted Vpaced 89%, PPM pacing rate was increased to 70bpm per Dr Joseph to increase CO. Pt reported dizziness x few months, CT Head performed ... 82M Mandarin-speaking with PMH CHF (EF 50% on echo) (on Lasix alternating 40mg/20mg every other day), atrial fibrillation on Xarelto, nonobstructive CAD, dual-chamber PPM ([employer] placed 05/2010), HTN, HLD, DM, brain mass being monitored (5 years) presenting from outpatient cardiologist Dr Joseph's office c/o progressive worsening LE edema, orthopnea, and dyspnea with exertion x 3 months. Pt states these symptoms began 3 months ago but has been worsening over the last month. Endorses occasional coughs productive of whitish sputum. Denies chest pain, fevers, chills, headaches, abdominal pain, dysuria. no chest pain but + SOB w/exertion, worsening exercise tolerance. In ED Vitals: T97.5F, HR 85, /82, RR 18, O2 97% on RA. Labs: INR 1.56, Trop 0.02, . CXR w/ pulm congestion, EKG w/ Afib w/ V pacing. Meds given IVP Lasix 40mg x1.  He was admitted to tele [facility] for acute systolic CHF exacerbation likely 2/2 URI vs chronic RV pacing. Ruled out ischemia via LHC revealing nonobstructive CAD. He received IV diuresis and was later transitioned to PO w/ improvement of his symptoms. Echo was performed noted EF 40-45%, mild global hypokinesis, mildly dilated LV, severely dilated LA/RA, mod aortic valve thickening, mild AI, mod MR, sev TR, mild pulm HTN, PASP 45. Outpt echo 3/2017 report noted EF 50%, LA/LV mod dilated, mild MR. RVP negative. He underwent L/RHC on 1/9/18 in light of acutely depressed LVEF w/ findings of nonobstructive CAD (LAD <30% stenosis), RA 12, RV 40/12, PA 40/16, PCWP 16, PA sat 60, Ao sat 80. Started Lisinopril 2.5mg qd for acute systolic CHF. EP interrogated PPM noted Vpaced 89%, PPM pacing rate was increased to 70bpm per Dr Joseph to increase CO. 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 Pt was ambulated in hallway w/ walker by staff w/ SpO2 monitoring, noted desaturating to 88% on RA, home O2 to be set up and will discharged on home O2 PRN. He was discharged hemodynamically stable and will f/u with Dr Joseph next week, will f/u with Pulmonary as outpt for further pulm w/u.

## 2018-01-08 NOTE — DISCHARGE NOTE ADULT - MEDICATION SUMMARY - MEDICATIONS TO TAKE
I will START or STAY ON the medications listed below when I get home from the hospital:    finasteride 5 mg oral tablet  -- 1 tab(s) by mouth once a day  -- Indication: For enlarged prostate    Letairis 5 mg oral tablet  -- 1 tab(s) by mouth once a day  -- Indication: For Pulmonary hypertension    lisinopril 2.5 mg oral tablet  -- 1 tab(s) by mouth once a day  -- Indication: For ACUTE ON CHRONIC CONGESTIVE HEART FAILURE    tamsulosin 0.4 mg oral capsule  -- 1 cap(s) by mouth once a day (at bedtime)  -- Indication: For enlarged prostate    Xarelto 20 mg oral tablet  -- 1 tab(s) by mouth once a day (in the evening)  -- Indication: For Atrial Fibrillation    traZODone 50 mg oral tablet  -- 1 tab(s) orally  -- Indication: For Depression    escitalopram 20 mg oral tablet  -- 1 tab(s) by mouth once a day  -- Indication: For Depression    glimepiride 1 mg oral tablet  -- 1 tab(s) by mouth once a day  -- Indication: For DM2 (diabetes mellitus, type 2)    Januvia 100 mg oral tablet  -- 1 tab(s) by mouth once a day  -- Indication: For DM2 (diabetes mellitus, type 2)    atorvastatin 10 mg oral tablet  -- 1 tab(s) by mouth once a day  -- Indication: For Hyperlipidemia    gemfibrozil 600 mg oral tablet  -- Indication: For Hyperlipidemia    Vascepa 1 g oral capsule  -- 2 cap(s) by mouth 2 times a day  -- Indication: For Hyperlipidemia    metoprolol succinate 25 mg oral tablet, extended release  -- 1 tab(s) by mouth once a day  -- Indication: For ACUTE ON CHRONIC CONGESTIVE HEART FAILURE/Atrial Fibrillation    Symbicort 160 mcg-4.5 mcg/inh inhalation aerosol  -- Indication: For Pulmonary hypertension    furosemide 40 mg oral tablet  -- Take 40mg alternating with 20mg every other day  -- Indication: For ACUTE ON CHRONIC CONGESTIVE HEART FAILURE    Singulair 10 mg oral tablet  -- 1 tab(s) by mouth once a day  -- Indication: For Pulmonary hypertension    potassium chloride 10 mEq oral capsule, extended release  -- 1 cap(s) by mouth 2 times a day  -- Indication: For supplement    latanoprost 0.005% ophthalmic solution  -- 1 drop(s) to each affected eye once a day (at bedtime)  -- Indication: For glaucoma

## 2018-01-08 NOTE — DISCHARGE NOTE ADULT - PLAN OF CARE
Follow up with Dr Joseph in 1-2 weeks You were admitted to the hospital for shortness of breath, bilateral leg swelling worsening over the past few months. You were given intravenous medication Lasix to get rid of the fluid in your lungs and body in order to help you breathe better. Please take Lasix as prescribed without missing doses. You had an echocardiogram that showed you have a weakened heart muscle. The pumping function (Ejection Fraction) of your heart is 40-45%, which is lower compared to echocardiograms performed previously. You underwent a cardiac catheterization on 1/9/18 and was found to have non-obstructive coronary arteries. Please maintain a low salt diet and weigh yourself daily and report any weight gain over 2 pounds/day to your Doctor. Your blood thinner medication Xarelto was held for 1 day due to your cardiac catheterization procedure. It was resumed after, please take the medication as prescribed. Please continue taking medication Letairis 5mg daily as prescribed by Dr Joseph. Please follow up with Dr Joseph in 1-2 weeks in the office. You were admitted to the hospital for shortness of breath, bilateral leg swelling worsening over the past few months. You were given intravenous medication Lasix to get rid of the fluid in your lungs and body in order to help you breathe better. Please take Lasix as prescribed without missing doses. You had an echocardiogram that showed you have a weakened heart muscle. The pumping function (Ejection Fraction) of your heart is 40-45%, which is lower compared to echocardiograms performed previously. You were also found to have a leaky Mitral Valve. You underwent a cardiac catheterization on 1/9/18 and was found to have non-obstructive coronary arteries. Please maintain a low salt diet and weigh yourself daily and report any weight gain over 2 pounds/day to your Doctor. You were admitted to the hospital for shortness of breath, bilateral leg swelling worsening over the past few months. You were given intravenous medication Lasix to get rid of the fluid in your lungs and body in order to help you breathe better. Please take your home Lasix dose as prescribed without missing doses (alternating between 40mg and 20mg every other day). You had an echocardiogram that showed you have a weakened heart muscle. The pumping function (Ejection Fraction) of your heart is 40-45%, which is lower compared to echocardiograms performed previously. You were also found to have a leaky Mitral Valve. You underwent a cardiac catheterization on 1/9/18 and was found to have non-obstructive coronary arteries. Please maintain a low salt diet and weigh yourself daily and report any weight gain over 2 pounds/day to your Doctor. Please continue taking medication Letairis 5mg daily as prescribed by Dr Joseph. Please follow up with Dr Joseph in the office next week You were admitted to the hospital for shortness of breath, bilateral leg swelling worsening over the past few months. You were given intravenous medication Lasix to get rid of the fluid in your lungs and body in order to help you breathe better. Please take your home Lasix dose as prescribed without missing doses (alternating between 40mg and 20mg every other day). You had an echocardiogram that showed you have a weakened heart muscle. The pumping function (Ejection Fraction) of your heart is 40-45%, which is lower compared to echocardiograms performed previously. You were also found to have a leaky Mitral Valve. You underwent a cardiac catheterization on 1/9/18 and was found to have non-obstructive coronary arteries. You were started on medication Lisinopril 2.5mg once a day to help improve your heart's pumping function. Please maintain a low salt diet and weigh yourself daily and report any weight gain over 2 pounds/day to your Doctor.

## 2018-01-08 NOTE — PROGRESS NOTE ADULT - PROBLEM SELECTOR PLAN 10
DVT ppx: heparin scq  FULL CODE  Dispo: [facility] DVT ppx: heparin scq  FULL CODE  Dispo: D/c pending clinical progress    PT Eval: home w/ home PT

## 2018-01-08 NOTE — PROGRESS NOTE ADULT - PROBLEM SELECTOR PLAN 5
s per outpatient records, A1c 6.7 (08/2017). On Januvia and glimepiride at home. DM2 well controlled given A1c 6.2 this morning.   - fingersticks QID and PRN with sliding scale coverage  - hold home Januvia and glimepiride while inpatient Per outpatient records, A1c 6.7 (08/2017). On Januvia and glimepiride at home. DM2 well controlled given A1c 6.2 this morning.   - fingersticks QID and PRN with sliding scale coverage  - hold home Januvia and glimepiride while inpatient

## 2018-01-08 NOTE — PROGRESS NOTE ADULT - SUBJECTIVE AND OBJECTIVE BOX
CARDIOLOGY NP PROGRESS NOTE    Subjective: Pt seen and examined at bedside. Reports feeling sob and rolando LE edema improving. Denies chest pain, sob at rest, lightheadedness, dizziness. Remainder ROS otherwise negative.    Overnight Events: None    TELEMETRY: Afib w/ Vpacing 60s    EKG: Afib w/ V-pacing 60s      VITAL SIGNS:  T(C): 36.4 (01-08-18 @ 09:18), Max: 36.6 (01-07-18 @ 21:48)  HR: 60 (01-08-18 @ 13:47) (60 - 62)  BP: 122/60 (01-08-18 @ 13:47) (112/59 - 135/63)  RR: 18 (01-08-18 @ 13:47) (16 - 18)  SpO2: 97% (01-08-18 @ 13:47) (93% - 97%)  Wt(kg): --    I&O's Summary    07 Jan 2018 07:01  -  08 Jan 2018 07:00  --------------------------------------------------------  IN: 450 mL / OUT: 2300 mL / NET: -1850 mL    08 Jan 2018 07:01  -  08 Jan 2018 13:59  --------------------------------------------------------  IN: 460 mL / OUT: 1275 mL / NET: -815 mL          PHYSICAL EXAM:    General: A/ox 3, No acute Distress  Neck: Supple, NO JVD  Cardiac: S1 S2, No M/R/G  Pulmonary: CTAB, Breathing unlabored, No Rhonchi/Rales/Wheezing  Abdomen: Soft, Non -tender, +BS x 4 quads  Extremities: No Rashes, No edema  Neuro: A/o x 3, No focal deficits          LABS:                          14.0   4.3   )-----------( 138      ( 07 Jan 2018 07:26 )             43.8                              01-08    145  |  104  |  24<H>  ----------------------------<  126<H>  4.0   |  32<H>  |  0.98    Ca    9.1      08 Jan 2018 06:23  Phos  4.5     01-07  Mg     2.2     01-08    TPro  7.8  /  Alb  4.5  /  TBili  0.7  /  DBili  x   /  AST  25  /  ALT  12  /  AlkPhos  43  01-06    LIVER FUNCTIONS - ( 06 Jan 2018 18:23 )  Alb: 4.5 g/dL / Pro: 7.8 g/dL / ALK PHOS: 43 U/L / ALT: 12 U/L / AST: 25 U/L / GGT: x                                 PT/INR - ( 06 Jan 2018 18:23 )   PT: 17.5 sec;   INR: 1.56          PTT - ( 06 Jan 2018 18:23 )  PTT:40.6 sec  CAPILLARY BLOOD GLUCOSE      POCT Blood Glucose.: 108 mg/dL (08 Jan 2018 11:44)  POCT Blood Glucose.: 115 mg/dL (08 Jan 2018 06:14)  POCT Blood Glucose.: 116 mg/dL (07 Jan 2018 22:06)  POCT Blood Glucose.: 181 mg/dL (07 Jan 2018 17:47)    CARDIAC MARKERS ( 07 Jan 2018 07:27 )  x     / 0.02 ng/mL / x     / x     / x      CARDIAC MARKERS ( 07 Jan 2018 03:23 )  x     / 0.03 ng/mL / x     / x     / x      CARDIAC MARKERS ( 06 Jan 2018 18:23 )  x     / 0.02 ng/mL / x     / x     / x              Allergies:  No Known Allergies    MEDICATIONS  (STANDING):  atorvastatin 10 milliGRAM(s) Oral at bedtime  buDESOnide 160 MICROgram(s)/formoterol 4.5 MICROgram(s) Inhaler 2 Puff(s) Inhalation two times a day  dextrose 5%. 1000 milliLiter(s) (50 mL/Hr) IV Continuous <Continuous>  dextrose 50% Injectable 12.5 Gram(s) IV Push once  dextrose 50% Injectable 25 Gram(s) IV Push once  dextrose 50% Injectable 25 Gram(s) IV Push once  escitalopram 20 milliGRAM(s) Oral daily  finasteride 5 milliGRAM(s) Oral daily  furosemide   Injectable 40 milliGRAM(s) IV Push daily  gemfibrozil 600 milliGRAM(s) Oral daily  insulin lispro (HumaLOG) corrective regimen sliding scale   SubCutaneous Before meals and at bedtime  latanoprost 0.005% Ophthalmic Solution 1 Drop(s) Both EYES at bedtime  metoprolol succinate ER 25 milliGRAM(s) Oral daily  montelukast 10 milliGRAM(s) Oral daily  rivaroxaban 20 milliGRAM(s) Oral every 24 hours  tamsulosin 0.4 milliGRAM(s) Oral at bedtime  traZODone 50 milliGRAM(s) Oral at bedtime    MEDICATIONS  (PRN):  dextrose Gel 1 Dose(s) Oral once PRN Blood Glucose LESS THAN 70 milliGRAM(s)/deciliter  glucagon  Injectable 1 milliGRAM(s) IntraMuscular once PRN Glucose LESS THAN 70 milligrams/deciliter        DIAGNOSTIC TESTS: CARDIOLOGY NP PROGRESS NOTE    Subjective: Pt seen and examined at bedside. Reports feeling sob and rolando LE edema improving. Denies chest pain, sob at rest, lightheadedness, dizziness. Remainder ROS otherwise negative.    Overnight Events: None    TELEMETRY: Afib w/ Vpacing 60s    EKG: Afib w/ V-pacing 60s      VITAL SIGNS:  T(C): 36.4 (01-08-18 @ 09:18), Max: 36.6 (01-07-18 @ 21:48)  HR: 60 (01-08-18 @ 13:47) (60 - 62)  BP: 122/60 (01-08-18 @ 13:47) (112/59 - 135/63)  RR: 18 (01-08-18 @ 13:47) (16 - 18)  SpO2: 97% (01-08-18 @ 13:47) (93% - 97%)  Wt(kg): --    I&O's Summary    07 Jan 2018 07:01  -  08 Jan 2018 07:00  --------------------------------------------------------  IN: 450 mL / OUT: 2300 mL / NET: -1850 mL    08 Jan 2018 07:01  -  08 Jan 2018 13:59  --------------------------------------------------------  IN: 460 mL / OUT: 1275 mL / NET: -815 mL          PHYSICAL EXAM:    General: A/ox 3, No acute Distress  Neck: Supple, + JVD  Cardiac: S1 S2, grade 2 systolic murmur  Pulmonary: Lungs bibasilar diminished, Breathing unlabored on2L, No Rhonchi/Wheezing  Abdomen: Soft, Non -tender, +BS x 4 quads  Extremities: No Rashes, 2+ pedal-ankle edema  Neuro: A/o x 3, No focal deficits          LABS:                          14.0   4.3   )-----------( 138      ( 07 Jan 2018 07:26 )             43.8                              01-08    145  |  104  |  24<H>  ----------------------------<  126<H>  4.0   |  32<H>  |  0.98    Ca    9.1      08 Jan 2018 06:23  Phos  4.5     01-07  Mg     2.2     01-08    TPro  7.8  /  Alb  4.5  /  TBili  0.7  /  DBili  x   /  AST  25  /  ALT  12  /  AlkPhos  43  01-06    LIVER FUNCTIONS - ( 06 Jan 2018 18:23 )  Alb: 4.5 g/dL / Pro: 7.8 g/dL / ALK PHOS: 43 U/L / ALT: 12 U/L / AST: 25 U/L / GGT: x                                 PT/INR - ( 06 Jan 2018 18:23 )   PT: 17.5 sec;   INR: 1.56          PTT - ( 06 Jan 2018 18:23 )  PTT:40.6 sec  CAPILLARY BLOOD GLUCOSE      POCT Blood Glucose.: 108 mg/dL (08 Jan 2018 11:44)  POCT Blood Glucose.: 115 mg/dL (08 Jan 2018 06:14)  POCT Blood Glucose.: 116 mg/dL (07 Jan 2018 22:06)  POCT Blood Glucose.: 181 mg/dL (07 Jan 2018 17:47)    CARDIAC MARKERS ( 07 Jan 2018 07:27 )  x     / 0.02 ng/mL / x     / x     / x      CARDIAC MARKERS ( 07 Jan 2018 03:23 )  x     / 0.03 ng/mL / x     / x     / x      CARDIAC MARKERS ( 06 Jan 2018 18:23 )  x     / 0.02 ng/mL / x     / x     / x              Allergies:  No Known Allergies    MEDICATIONS  (STANDING):  atorvastatin 10 milliGRAM(s) Oral at bedtime  buDESOnide 160 MICROgram(s)/formoterol 4.5 MICROgram(s) Inhaler 2 Puff(s) Inhalation two times a day  dextrose 5%. 1000 milliLiter(s) (50 mL/Hr) IV Continuous <Continuous>  dextrose 50% Injectable 12.5 Gram(s) IV Push once  dextrose 50% Injectable 25 Gram(s) IV Push once  dextrose 50% Injectable 25 Gram(s) IV Push once  escitalopram 20 milliGRAM(s) Oral daily  finasteride 5 milliGRAM(s) Oral daily  furosemide   Injectable 40 milliGRAM(s) IV Push daily  gemfibrozil 600 milliGRAM(s) Oral daily  insulin lispro (HumaLOG) corrective regimen sliding scale   SubCutaneous Before meals and at bedtime  latanoprost 0.005% Ophthalmic Solution 1 Drop(s) Both EYES at bedtime  metoprolol succinate ER 25 milliGRAM(s) Oral daily  montelukast 10 milliGRAM(s) Oral daily  rivaroxaban 20 milliGRAM(s) Oral every 24 hours  tamsulosin 0.4 milliGRAM(s) Oral at bedtime  traZODone 50 milliGRAM(s) Oral at bedtime    MEDICATIONS  (PRN):  dextrose Gel 1 Dose(s) Oral once PRN Blood Glucose LESS THAN 70 milliGRAM(s)/deciliter  glucagon  Injectable 1 milliGRAM(s) IntraMuscular once PRN Glucose LESS THAN 70 milligrams/deciliter        DIAGNOSTIC TESTS:     TTE Echo w/Cont Complete (01.08.18 @ 11:26)  The left ventricle is mildly dilated. No left ventricular hypertrophy.There is mild global hypokinesis of the left ventricle. The left ventricular ejection fraction is estimated to be 40-45%  The left atrium is severely dilated.The right atrium is severely dilated. The right ventricle is not well visualized. There is moderate aortic valve thickening. There is mild aortic   regurgitation.  There is mild mitral valve thickening and mild mitral annular calcification.   There is moderate mitral regurgitation.  There is severe tricuspid regurgitation.There is mild pulmonary hypertension. The pulmonary artery systolic pressure is estimated to be 45 mmHg.  No aortic root dilatation.The inferior vena cava was not well visualized.There is no pericardial effusion.

## 2018-01-08 NOTE — DISCHARGE NOTE ADULT - COMMUNITY RESOURCES
Reinstate HA from [facility] Plan for Healthy Living 5d x 6hr, p: [phone]

## 2018-01-09 LAB
ANION GAP SERPL CALC-SCNC: 10 MMOL/L — SIGNIFICANT CHANGE UP (ref 5–17)
APTT BLD: 48.4 SEC — HIGH (ref 27.5–37.4)
BUN SERPL-MCNC: 26 MG/DL — HIGH (ref 7–23)
CALCIUM SERPL-MCNC: 9.2 MG/DL — SIGNIFICANT CHANGE UP (ref 8.4–10.5)
CHLORIDE SERPL-SCNC: 99 MMOL/L — SIGNIFICANT CHANGE UP (ref 96–108)
CHOLEST SERPL-MCNC: 133 MG/DL — SIGNIFICANT CHANGE UP (ref 10–199)
CO2 SERPL-SCNC: 32 MMOL/L — HIGH (ref 22–31)
CREAT SERPL-MCNC: 1.05 MG/DL — SIGNIFICANT CHANGE UP (ref 0.5–1.3)
GLUCOSE SERPL-MCNC: 139 MG/DL — HIGH (ref 70–99)
HCT VFR BLD CALC: 42.9 % — SIGNIFICANT CHANGE UP (ref 39–50)
HDLC SERPL-MCNC: 43 MG/DL — SIGNIFICANT CHANGE UP (ref 40–125)
HGB BLD-MCNC: 13.6 G/DL — SIGNIFICANT CHANGE UP (ref 13–17)
INR BLD: 1.46 — HIGH (ref 0.88–1.16)
LIPID PNL WITH DIRECT LDL SERPL: 72 MG/DL — SIGNIFICANT CHANGE UP
MAGNESIUM SERPL-MCNC: 2.2 MG/DL — SIGNIFICANT CHANGE UP (ref 1.6–2.6)
MCHC RBC-ENTMCNC: 30.2 PG — SIGNIFICANT CHANGE UP (ref 27–34)
MCHC RBC-ENTMCNC: 31.7 G/DL — LOW (ref 32–36)
MCV RBC AUTO: 95.1 FL — SIGNIFICANT CHANGE UP (ref 80–100)
PLATELET # BLD AUTO: 136 K/UL — LOW (ref 150–400)
POTASSIUM SERPL-MCNC: 4.1 MMOL/L — SIGNIFICANT CHANGE UP (ref 3.5–5.3)
POTASSIUM SERPL-SCNC: 4.1 MMOL/L — SIGNIFICANT CHANGE UP (ref 3.5–5.3)
PROTHROM AB SERPL-ACNC: 16.3 SEC — HIGH (ref 9.8–12.7)
RBC # BLD: 4.51 M/UL — SIGNIFICANT CHANGE UP (ref 4.2–5.8)
RBC # FLD: 13.4 % — SIGNIFICANT CHANGE UP (ref 10.3–16.9)
SODIUM SERPL-SCNC: 141 MMOL/L — SIGNIFICANT CHANGE UP (ref 135–145)
TOTAL CHOLESTEROL/HDL RATIO MEASUREMENT: 3.1 RATIO — LOW (ref 3.4–9.6)
TRIGL SERPL-MCNC: 92 MG/DL — SIGNIFICANT CHANGE UP (ref 10–149)
WBC # BLD: 4.4 K/UL — SIGNIFICANT CHANGE UP (ref 3.8–10.5)
WBC # FLD AUTO: 4.4 K/UL — SIGNIFICANT CHANGE UP (ref 3.8–10.5)

## 2018-01-09 PROCEDURE — 93456 R HRT CORONARY ARTERY ANGIO: CPT | Mod: 26

## 2018-01-09 PROCEDURE — 93010 ELECTROCARDIOGRAM REPORT: CPT

## 2018-01-09 RX ORDER — FUROSEMIDE 40 MG
40 TABLET ORAL DAILY
Qty: 0 | Refills: 0 | Status: DISCONTINUED | OUTPATIENT
Start: 2018-01-10 | End: 2018-01-11

## 2018-01-09 RX ORDER — AMBRISENTAN 10 MG/1
5 TABLET, FILM COATED ORAL DAILY
Qty: 0 | Refills: 0 | Status: DISCONTINUED | OUTPATIENT
Start: 2018-01-09 | End: 2018-01-11

## 2018-01-09 RX ADMIN — MONTELUKAST 10 MILLIGRAM(S): 4 TABLET, CHEWABLE ORAL at 21:57

## 2018-01-09 RX ADMIN — LISINOPRIL 2.5 MILLIGRAM(S): 2.5 TABLET ORAL at 06:26

## 2018-01-09 RX ADMIN — ATORVASTATIN CALCIUM 10 MILLIGRAM(S): 80 TABLET, FILM COATED ORAL at 21:57

## 2018-01-09 RX ADMIN — Medication 40 MILLIGRAM(S): at 06:26

## 2018-01-09 RX ADMIN — Medication 2: at 16:28

## 2018-01-09 RX ADMIN — Medication 25 MILLIGRAM(S): at 06:26

## 2018-01-09 RX ADMIN — LATANOPROST 1 DROP(S): 0.05 SOLUTION/ DROPS OPHTHALMIC; TOPICAL at 21:57

## 2018-01-09 RX ADMIN — Medication 600 MILLIGRAM(S): at 21:57

## 2018-01-09 RX ADMIN — BUDESONIDE AND FORMOTEROL FUMARATE DIHYDRATE 2 PUFF(S): 160; 4.5 AEROSOL RESPIRATORY (INHALATION) at 16:25

## 2018-01-09 RX ADMIN — TAMSULOSIN HYDROCHLORIDE 0.4 MILLIGRAM(S): 0.4 CAPSULE ORAL at 21:57

## 2018-01-09 RX ADMIN — AMBRISENTAN 5 MILLIGRAM(S): 10 TABLET, FILM COATED ORAL at 16:24

## 2018-01-09 RX ADMIN — FINASTERIDE 5 MILLIGRAM(S): 5 TABLET, FILM COATED ORAL at 16:25

## 2018-01-09 RX ADMIN — BUDESONIDE AND FORMOTEROL FUMARATE DIHYDRATE 2 PUFF(S): 160; 4.5 AEROSOL RESPIRATORY (INHALATION) at 06:26

## 2018-01-09 RX ADMIN — ESCITALOPRAM OXALATE 20 MILLIGRAM(S): 10 TABLET, FILM COATED ORAL at 16:25

## 2018-01-09 RX ADMIN — Medication 50 MILLIGRAM(S): at 21:57

## 2018-01-09 NOTE — PHYSICAL THERAPY INITIAL EVALUATION ADULT - PERTINENT HX OF CURRENT PROBLEM, REHAB EVAL
82 year old male presented from Dr. Joseph's outpatient office with worsening LE edema, orthopnea, and dyspnea with exertion x 3 months.

## 2018-01-09 NOTE — PROGRESS NOTE ADULT - PROBLEM SELECTOR PLAN 9
F: none  E: monitor and replete electrolytes as needed for K<4, Mg<2  N: DASH/TLC, carb-consistent diet, 1L fluid restriction

## 2018-01-09 NOTE — PROGRESS NOTE ADULT - SUBJECTIVE AND OBJECTIVE BOX
Interventional Cardiology Radial band Removal Note    s/p Heparin 			    Pt without complaints.  VSS.    Right Radial access site vascband in place, no hematoma, no bleed  Radial pulse: 1+    Hemostasis achieved with manual release of hemoband.    no Vasovagal reaction.    Meds given:    Right Radial access site  no hematoma, no bleed  Radial pulse:    A/P:  s/p Dx Coronary Angiogram  -	continue to monitor  -	OOB as tolerated  -	Post Procedure Instructions given  -           Call [phone] if any access site issues. Interventional Cardiology Radial band Removal Note    s/p Heparin 			    Pt without complaints.  VSS.    Right Radial access site vascband in place, no hematoma, no bleed  Radial pulse: 1+    Hemostasis achieved with manual release of hemoband.    no Vasovagal reaction.    Meds given: no    Right Radial access site  no hematoma, no bleed  Radial pulse: 1+    A/P:  s/p Dx Coronary Angiogram  -	continue to monitor  -	OOB as tolerated  -	Post Procedure Instructions given  -           Call [phone] if any access site issues.

## 2018-01-09 NOTE — PROGRESS NOTE ADULT - PROBLEM SELECTOR PLAN 4
Pt with severe pulmonary HTN as per outpatient notes. Takes Letairis 5mg PO daily at home which is not available on formulary   -Resumed outpt Letaris 5mg qd as family brought mediation from home.  His PH could be due to left sided heart disease.  Will reevaluate after the R and L heart cath.   -Echo noted mild pulm HTN, PASP 45

## 2018-01-09 NOTE — PHYSICAL THERAPY INITIAL EVALUATION ADULT - TRANSFER SAFETY CONCERNS NOTED: SIT/STAND, REHAB EVAL
slightly unsteady with rolling walker, no loss of balance; unsteady with standard cane, no loss of balance

## 2018-01-09 NOTE — PROGRESS NOTE ADULT - SUBJECTIVE AND OBJECTIVE BOX
Interventional Cardiology PA Precath Note      HPI: 82M Mandarin-speaking with PMH CHF (on Lasix alternating 40mg/20mg every other day), atrial fibrillation on Xarelto, nonobstructive CAD(CTA Cardiac 5/2017 w/ Calcium score 726, mild disease noted in the LAD and RCA, FFR >0.8.), h/o MI, dual-chamber PPM ([employer] placed 05/2010), HTN, HLD, DM, brain mass being monitored (5 years) presenting from Dr. Joseph's outpatient office today with worsening LE edema, orthopnea, and dyspnea with exertion x 3 months. Pt states these symptoms began 3 months ago but has been worsening over the last month. Endorses occasional coughs productive of whitish sputum. Denies chest pain, fevers, chills, headaches, abdominal pain, dysuria. no chest pain but + SOB w/exertion, worsening exercise tolerance. Upon admission Echo performed w/ EF 40-45%, mild global hypokinesis, mildly dilated LV, severely dilated LA/RA, mod aortic valve thickening, mild AI, mod MR, sev TR, mild pulm HTN, PASP 45. Continuing diuresis with Lasix 40 mg IV BID.  Pt will undergo R&L cardiac catheterization to evaluate for sig CAD and to assess pulmonary HTN.     Anginal class  3/4    Radiology:  X-ray: < from: Xray Chest 1 View AP -PORTABLE-Routine (01.06.18 @ 19:48) >  Portable examination the chest demonstrates cardiomegaly. No acute infiltrates. Pacemaker overlies left chest wall. Tips of pacer wires overlie right atrium and right ventricle respectively degenerative changes thoracic spine. Impression: No acute infiltrates      CT Scan: (CTA Cardiac 5/2017 w/ Calcium score 726, mild disease noted in the LAD and RCA, FFR >0.8.)  MRI:  Ultrasound/ECHO:  EF 40-45%, mild global hypokinesis, mildly dilated LV, severely dilated LA/RA, mod aortic valve thickening, mild AI, mod MR, sev TR, mild pulm HTN, PASP 45.  Stress test:  Prior Cath Hx:    ALL: NKDA, NKFA. Denies shellfish/Contrast dye allergy.  SocHX: Denies EtoH/TOB/IVDU  FHx:   MEDS:   atorvastatin 10 milliGRAM(s) Oral at bedtime  buDESOnide 160 MICROgram(s)/formoterol 4.5 MICROgram(s) Inhaler 2 Puff(s) Inhalation two times a day  dextrose 5%. 1000 milliLiter(s) IV Continuous <Continuous>  dextrose 50% Injectable 12.5 Gram(s) IV Push once  dextrose 50% Injectable 25 Gram(s) IV Push once  dextrose 50% Injectable 25 Gram(s) IV Push once  dextrose Gel 1 Dose(s) Oral once PRN  escitalopram 20 milliGRAM(s) Oral daily  finasteride 5 milliGRAM(s) Oral daily  furosemide   Injectable 40 milliGRAM(s) IV Push daily  gemfibrozil 600 milliGRAM(s) Oral daily  glucagon  Injectable 1 milliGRAM(s) IntraMuscular once PRN  insulin lispro (HumaLOG) corrective regimen sliding scale   SubCutaneous Before meals and at bedtime  latanoprost 0.005% Ophthalmic Solution 1 Drop(s) Both EYES at bedtime  lisinopril 2.5 milliGRAM(s) Oral daily  metoprolol succinate ER 25 milliGRAM(s) Oral daily  montelukast 10 milliGRAM(s) Oral daily  rivaroxaban 20 milliGRAM(s) Oral every 24 hours  tamsulosin 0.4 milliGRAM(s) Oral at bedtime  traZODone 50 milliGRAM(s) Oral at bedtime    T(C): 36.6 (01-09-18 @ 05:40), Max: 37.2 (01-08-18 @ 19:47)  HR: 60 (01-09-18 @ 05:17) (60 - 60)  BP: 136/74 (01-09-18 @ 05:17) (113/60 - 138/69)  RR: 16 (01-09-18 @ 05:17) (16 - 18)  SpO2: 92% (01-09-18 @ 05:17) (92% - 97%)  Wt(kg): --  HEENT: NCAT, EOMI, PERRLA  NECK: No JVD, No carotid bruits B/L, +2 Carotid pulses B/L  PULM:  CTA B/L No W/R/R  CARD: RRR, +S1, +S2, No M/R/G  ABD: ND, +BS, NT, no masses  EXT: Warm, pedal edema yes/no, pitting/non-pitting  RECTAL: Guaiac negative/positive   NEURO: A & O x 3, no focal neurologic deficits  PULSES:	B	    R	      FEM         	                                            DP                          PT  Right		                                                  Yes/No     Bruit	    Left		                                                  Yes/No     Bruit                            13.6   4.4   )-----------( 136      ( 09 Jan 2018 06:08 )             42.9     01-09    141  |  99  |  26<H>  ----------------------------<  139<H>  4.1   |  32<H>  |  1.05    Ca    9.2      09 Jan 2018 06:08  Mg     2.2     01-09            EKG:					  ASA III				Mallampati class: III	            Anginal Class: III/IV  A/P:   Pt will undergo R&L cardiac catheterization to evaluate for sig CAD and to assess pulmonary HTN.       Sedation Plan:   ? None   ? Moderate    ?  Deep    ?  General Anesthesia   Patient Is Suitable Candidate For Sedation?     ? Yes   ? No   ? Not Applicable     Risks & benefits of procedure and sedation and risks and benefits for the alternative therapy have been explained to the patient in layman’s terms including but not limited to: allergic reaction, bleeding, infection, arrhythmia, respiratory compromise, renal and vascular compromise, limb damage, MI, CVA, emergent CABG/Vascular Surgery and death. Informed consent obtained and in chart. Interventional Cardiology PA Precath Note      HPI: 82M Mandarin-speaking with PMH CHF (on Lasix alternating 40mg/20mg every other day), atrial fibrillation on Xarelto, nonobstructive CAD(CTA Cardiac 5/2017 w/ Calcium score 726, mild disease noted in the LAD and RCA, FFR >0.8.), h/o MI, dual-chamber PPM ([employer] placed 05/2010), HTN, HLD, DM, brain mass being monitored (5 years) presenting from Dr. Joseph's outpatient office today with worsening LE edema, orthopnea, and dyspnea with exertion x 3 months. Pt states these symptoms began 3 months ago but has been worsening over the last month. Endorses occasional coughs productive of whitish sputum. Denies chest pain, fevers, chills, headaches, abdominal pain, dysuria. no chest pain but + SOB w/exertion, worsening exercise tolerance. Upon admission Echo performed w/ EF 40-45%, mild global hypokinesis, mildly dilated LV, severely dilated LA/RA, mod aortic valve thickening, mild AI, mod MR, sev TR, mild pulm HTN, PASP 45. Continuing diuresis with Lasix 40 mg IV BID.  Pt will undergo R&L cardiac catheterization to evaluate for sig CAD and to assess pulmonary HTN.   **Of note, patient was given Xarelto last PM. Dr. Farr made aware and will proceed with case through radial approach and AC.   SUBJ: Today patient feels well, reports mild SOB, able to lie flat. Denies CP, palpitations, fever, chills, LE edema, n/v/d.    Anginal class  3/4    X-ray: < from: Xray Chest 1 View AP -PORTABLE-Routine (01.06.18 @ 19:48) >  Portable examination the chest demonstrates cardiomegaly. No acute infiltrates. Pacemaker overlies left chest wall. Tips of pacer wires overlie right atrium and right ventricle respectively degenerative changes thoracic spine. Impression: No acute infiltrates  CT Scan: (CTA Cardiac 5/2017 w/ Calcium score 726, mild disease noted in the LAD and RCA, FFR >0.8.)  MRI:  Ultrasound/ECHO:  EF 40-45%, mild global hypokinesis, mildly dilated LV, severely dilated LA/RA, mod aortic valve thickening, mild AI, mod MR, sev TR, mild pulm HTN, PASP 45.  Stress test:  Prior Cath Hx:    ALL: NKDA, NKFA. Denies shellfish/Contrast dye allergy.  SocHX: Denies EtoH/TOB/IVDU    MEDS:   atorvastatin 10 milliGRAM(s) Oral at bedtime  buDESOnide 160 MICROgram(s)/formoterol 4.5 MICROgram(s) Inhaler 2 Puff(s) Inhalation two times a day  dextrose 5%. 1000 milliLiter(s) IV Continuous <Continuous>  dextrose 50% Injectable 12.5 Gram(s) IV Push once  dextrose 50% Injectable 25 Gram(s) IV Push once  dextrose 50% Injectable 25 Gram(s) IV Push once  dextrose Gel 1 Dose(s) Oral once PRN  escitalopram 20 milliGRAM(s) Oral daily  finasteride 5 milliGRAM(s) Oral daily  furosemide   Injectable 40 milliGRAM(s) IV Push daily  gemfibrozil 600 milliGRAM(s) Oral daily  glucagon  Injectable 1 milliGRAM(s) IntraMuscular once PRN  insulin lispro (HumaLOG) corrective regimen sliding scale   SubCutaneous Before meals and at bedtime  latanoprost 0.005% Ophthalmic Solution 1 Drop(s) Both EYES at bedtime  lisinopril 2.5 milliGRAM(s) Oral daily  metoprolol succinate ER 25 milliGRAM(s) Oral daily  montelukast 10 milliGRAM(s) Oral daily  rivaroxaban 20 milliGRAM(s) Oral every 24 hours  tamsulosin 0.4 milliGRAM(s) Oral at bedtime  traZODone 50 milliGRAM(s) Oral at bedtime    T(C): 36.6 (01-09-18 @ 05:40), Max: 37.2 (01-08-18 @ 19:47)  HR: 60 (01-09-18 @ 05:17) (60 - 60)  BP: 136/74 (01-09-18 @ 05:17) (113/60 - 138/69)  RR: 16 (01-09-18 @ 05:17) (16 - 18)  SpO2: 92% (01-09-18 @ 05:17) (92% - 97%)  Wt(kg): --  HEENT: NCAT, EOMI, PERRLA  NECK: No JVD, No carotid bruits B/L  PULM:  CTA B/L No W/R/R  CARD: RRR, +S1, +S2, + systolic murmur III/VI  ABD: ND, +BS, NT, no masses  EXT: Warm, no pedal edema    NEURO: A & O x 3, no focal neurologic deficits  PULSES:	B	    R	      FEM         	                                            DP                          PT  Right		   2+                                               Yes/No     Bruit	    Left		                                                  Yes/No     Bruit                            13.6   4.4   )-----------( 136      ( 09 Jan 2018 06:08 )             42.9     01-09    141  |  99  |  26<H>  ----------------------------<  139<H>  4.1   |  32<H>  |  1.05    Ca    9.2      09 Jan 2018 06:08  Mg     2.2     01-09        EKG:					  ASA III				Mallampati class: III	            Anginal Class: III/IV  A/P:   Pt will undergo R&L cardiac catheterization to evaluate for sig CAD and to assess pulmonary HTN.       Sedation Plan:   ? None   ? Moderate    ?  Deep    ?  General Anesthesia   Patient Is Suitable Candidate For Sedation?     ? Yes   ? No   ? Not Applicable     Risks & benefits of procedure and sedation and risks and benefits for the alternative therapy have been explained to the patient in layman’s terms including but not limited to: allergic reaction, bleeding, infection, arrhythmia, respiratory compromise, renal and vascular compromise, limb damage, MI, CVA, emergent CABG/Vascular Surgery and death. Informed consent obtained and in chart. Interventional Cardiology PA Precath Note      HPI: 82M Mandarin-speaking with PMH CHF (on Lasix alternating 40mg/20mg every other day), atrial fibrillation on Xarelto, nonobstructive CAD(CTA Cardiac 5/2017 w/ Calcium score 726, mild disease noted in the LAD and RCA, FFR >0.8.), h/o MI, dual-chamber PPM ([employer] placed 05/2010), HTN, HLD, DM, brain mass being monitored (5 years) presenting from Dr. Joseph's outpatient office today with worsening LE edema, orthopnea, and dyspnea with exertion x 3 months. Pt states these symptoms began 3 months ago but has been worsening over the last month. Endorses occasional coughs productive of whitish sputum. Denies chest pain, fevers, chills, headaches, abdominal pain, dysuria. no chest pain but + SOB w/exertion, worsening exercise tolerance. Upon admission Echo performed w/ EF 40-45%, mild global hypokinesis, mildly dilated LV, severely dilated LA/RA, mod aortic valve thickening, mild AI, mod MR, sev TR, mild pulm HTN, PASP 45. Continuing diuresis with Lasix 40 mg IV BID.  Pt will undergo R&L cardiac catheterization to evaluate for sig CAD and to assess pulmonary HTN.   **Of note, patient was given Xarelto last PM. Dr. Farr made aware and will proceed with case through radial approach and AC.   SUBJ: Today patient feels well, reports mild SOB, able to lie flat. Denies CP, palpitations, fever, chills, LE edema, n/v/d.    Anginal class  3/4    X-ray: < from: Xray Chest 1 View AP -PORTABLE-Routine (01.06.18 @ 19:48) >  Portable examination the chest demonstrates cardiomegaly. No acute infiltrates. Pacemaker overlies left chest wall. Tips of pacer wires overlie right atrium and right ventricle respectively degenerative changes thoracic spine. Impression: No acute infiltrates  CT Scan: (CTA Cardiac 5/2017 w/ Calcium score 726, mild disease noted in the LAD and RCA, FFR >0.8.)  MRI:  Ultrasound/ECHO:  EF 40-45%, mild global hypokinesis, mildly dilated LV, severely dilated LA/RA, mod aortic valve thickening, mild AI, mod MR, sev TR, mild pulm HTN, PASP 45.  Stress test:  Prior Cath Hx:    ALL: NKDA, NKFA. Denies shellfish/Contrast dye allergy.  SocHX: Denies EtoH/TOB/IVDU    MEDS:   atorvastatin 10 milliGRAM(s) Oral at bedtime  buDESOnide 160 MICROgram(s)/formoterol 4.5 MICROgram(s) Inhaler 2 Puff(s) Inhalation two times a day  dextrose 5%. 1000 milliLiter(s) IV Continuous <Continuous>  dextrose 50% Injectable 12.5 Gram(s) IV Push once  dextrose 50% Injectable 25 Gram(s) IV Push once  dextrose 50% Injectable 25 Gram(s) IV Push once  dextrose Gel 1 Dose(s) Oral once PRN  escitalopram 20 milliGRAM(s) Oral daily  finasteride 5 milliGRAM(s) Oral daily  furosemide   Injectable 40 milliGRAM(s) IV Push daily  gemfibrozil 600 milliGRAM(s) Oral daily  glucagon  Injectable 1 milliGRAM(s) IntraMuscular once PRN  insulin lispro (HumaLOG) corrective regimen sliding scale   SubCutaneous Before meals and at bedtime  latanoprost 0.005% Ophthalmic Solution 1 Drop(s) Both EYES at bedtime  lisinopril 2.5 milliGRAM(s) Oral daily  metoprolol succinate ER 25 milliGRAM(s) Oral daily  montelukast 10 milliGRAM(s) Oral daily  rivaroxaban 20 milliGRAM(s) Oral every 24 hours  tamsulosin 0.4 milliGRAM(s) Oral at bedtime  traZODone 50 milliGRAM(s) Oral at bedtime    T(C): 36.6 (01-09-18 @ 05:40), Max: 37.2 (01-08-18 @ 19:47)  HR: 60 (01-09-18 @ 05:17) (60 - 60)  BP: 136/74 (01-09-18 @ 05:17) (113/60 - 138/69)  RR: 16 (01-09-18 @ 05:17) (16 - 18)  SpO2: 92% (01-09-18 @ 05:17) (92% - 97%)  Wt(kg): --  HEENT: NCAT, EOMI, PERRLA  NECK: No JVD, No carotid bruits B/L  PULM:  CTA B/L No W/R/R  CARD: RRR, +S1, +S2, + systolic murmur III/VI  ABD: ND, +BS, NT, no masses  EXT: Warm, no pedal edema    NEURO: A & O x 3, no focal neurologic deficits  PULSES:	B	    R	      FEM         	                                            DP                          PT  Right		   2+                                               Yes/No     Bruit	    Left		                                                  Yes/No     Bruit                            13.6   4.4   )-----------( 136      ( 09 Jan 2018 06:08 )             42.9     01-09    141  |  99  |  26<H>  ----------------------------<  139<H>  4.1   |  32<H>  |  1.05    Ca    9.2      09 Jan 2018 06:08  Mg     2.2     01-09    PT/INR - ( 09 Jan 2018 09:12 )   PT: 16.3 sec;   INR: 1.46          PTT - ( 09 Jan 2018 09:12 )  PTT:48.4 sec        EKG:					  ASA III				Mallampati class: III	            Anginal Class: III/IV  A/P:   Pt will undergo R&L cardiac catheterization to evaluate for sig CAD and to assess pulmonary HTN.       Sedation Plan:   ? None   ? Moderate    ?  Deep    ?  General Anesthesia   Patient Is Suitable Candidate For Sedation?     ? Yes   ? No   ? Not Applicable     Risks & benefits of procedure and sedation and risks and benefits for the alternative therapy have been explained to the patient in layman’s terms including but not limited to: allergic reaction, bleeding, infection, arrhythmia, respiratory compromise, renal and vascular compromise, limb damage, MI, CVA, emergent CABG/Vascular Surgery and death. Informed consent obtained and in chart.

## 2018-01-09 NOTE — PHYSICAL THERAPY INITIAL EVALUATION ADULT - IMPAIRMENTS FOUND, PT EVAL
ventilation and respiration/gas exchange/aerobic capacity/endurance/gait, locomotion, and balance/circulation

## 2018-01-09 NOTE — PROGRESS NOTE ADULT - PROBLEM SELECTOR PLAN 1
Pt with known diastolic CHF (EF 65% on CTA cardiac), on alternating Lasix 40mg/20mg PO daily at home, s/p IV Lasix 40mg in ED. Trop peaked 0.03. . EKG afib w/ V-paced. On exam b/l LE edema, +JVD, and crackles L>R. CXR showing L sided pleural effusion. Etiology of acute systolic CHF exacerbation likely 2/2 URI/viral illness, chronic RV pacing, vs ischemia.  - Monitor strict I/Os, daily weight  - C/w Lasix 40mg IV (home dose 40mg/20mg alternating qod)  -C/w Metoprolol succinate 25mg qd  -Started Lisinopril 2.5mg qd  - Echo performed w/ EF 40-45%, mild global hypokinesis, mildly dilated LV, severely dilated LA/RA, mod aortic valve thickening, mild AI, mod MR, sev TR, mild pulm HTN, PASP 45.  -CTA Cardiac 5/2017 w/ Calcium score 726, mild disease noted in the LAD and RCA, FFR >0.8.  -Etiology decreased EF possibly 2/2 chronic RV pacing, although ischemia remains in differential  -Due to significant decrease in LVEF from over 60% on CCTA to below 50% on Echo now and worsening RIBEIRO and CHF symptoms pending R and L heart cath today w/ Dr Farr.

## 2018-01-09 NOTE — PROGRESS NOTE ADULT - PROBLEM SELECTOR PLAN 3
Troponin at 0.02 on admission, peaked at 0.03. Mildly elevated trop likely 2/2 acute on chronic CHF.  -EKG uninterpretable as is Vpaced  -pending R/[facility] today w/ Dr Farr

## 2018-01-09 NOTE — PROGRESS NOTE ADULT - PROBLEM SELECTOR PLAN 5
Per outpatient records, A1c 6.7 (08/2017). On Januvia and glimepiride at home. DM2 well controlled given A1c 6.2 this morning.   - fingersticks QID and PRN with sliding scale coverage  - hold home Januvia and glimepiride while inpatient

## 2018-01-09 NOTE — PROGRESS NOTE ADULT - SUBJECTIVE AND OBJECTIVE BOX
CARDIOLOGY NP PROGRESS NOTE    Subjective: Pt seen and examined at bedside. Reports mild dizziness when ambulating. Denies chest pain, sob w/ ambulation, palpitations, lightheadedness. Remainder ROS otherwise negative.    Overnight Events: None    TELEMETRY: Afib w/ VPace        VITAL SIGNS:  T(C): 36.4 (01-09-18 @ 09:26), Max: 37.2 (01-08-18 @ 19:47)  HR: 60 (01-09-18 @ 09:25) (60 - 60)  BP: 112/59 (01-09-18 @ 09:25) (112/59 - 138/69)  RR: 16 (01-09-18 @ 08:41) (16 - 18)  SpO2: 93% (01-09-18 @ 09:25) (92% - 97%)  Wt(kg): --    I&O's Summary    08 Jan 2018 07:01  -  09 Jan 2018 07:00  --------------------------------------------------------  IN: 700 mL / OUT: 2270 mL / NET: -1570 mL    09 Jan 2018 07:01  -  09 Jan 2018 11:56  --------------------------------------------------------  IN: 0 mL / OUT: 650 mL / NET: -650 mL          PHYSICAL EXAM:    General: A/ox 3, No acute Distress  Neck: Supple, no JVD  Cardiac: S1 S2, grade 2 systolic murmur  Pulmonary: Lungs bibasilar diminished, Breathing unlabored on RA, No Rhonchi/Wheezing  Abdomen: Soft, Non -tender, +BS x 4 quads  Extremities: No Rashes, 2+ pedal-ankle edema  Neuro: A/o x 3, No focal deficits          LABS:                          13.6   4.4   )-----------( 136      ( 09 Jan 2018 06:08 )             42.9                              01-09    141  |  99  |  26<H>  ----------------------------<  139<H>  4.1   |  32<H>  |  1.05    Ca    9.2      09 Jan 2018 06:08  Mg     2.2     01-09                              PT/INR - ( 09 Jan 2018 09:12 )   PT: 16.3 sec;   INR: 1.46          PTT - ( 09 Jan 2018 09:12 )  PTT:48.4 sec  CAPILLARY BLOOD GLUCOSE      POCT Blood Glucose.: 127 mg/dL (09 Jan 2018 10:41)  POCT Blood Glucose.: 134 mg/dL (09 Jan 2018 05:55)  POCT Blood Glucose.: 108 mg/dL (08 Jan 2018 21:59)  POCT Blood Glucose.: 126 mg/dL (08 Jan 2018 16:19)            Allergies:  No Known Allergies    MEDICATIONS  (STANDING):  ambrisentan 5 milliGRAM(s) Oral daily  atorvastatin 10 milliGRAM(s) Oral at bedtime  buDESOnide 160 MICROgram(s)/formoterol 4.5 MICROgram(s) Inhaler 2 Puff(s) Inhalation two times a day  dextrose 5%. 1000 milliLiter(s) (50 mL/Hr) IV Continuous <Continuous>  dextrose 50% Injectable 12.5 Gram(s) IV Push once  dextrose 50% Injectable 25 Gram(s) IV Push once  dextrose 50% Injectable 25 Gram(s) IV Push once  escitalopram 20 milliGRAM(s) Oral daily  finasteride 5 milliGRAM(s) Oral daily  furosemide   Injectable 40 milliGRAM(s) IV Push daily  gemfibrozil 600 milliGRAM(s) Oral daily  insulin lispro (HumaLOG) corrective regimen sliding scale   SubCutaneous Before meals and at bedtime  latanoprost 0.005% Ophthalmic Solution 1 Drop(s) Both EYES at bedtime  lisinopril 2.5 milliGRAM(s) Oral daily  metoprolol succinate ER 25 milliGRAM(s) Oral daily  montelukast 10 milliGRAM(s) Oral daily  tamsulosin 0.4 milliGRAM(s) Oral at bedtime  traZODone 50 milliGRAM(s) Oral at bedtime    MEDICATIONS  (PRN):  dextrose Gel 1 Dose(s) Oral once PRN Blood Glucose LESS THAN 70 milliGRAM(s)/deciliter  glucagon  Injectable 1 milliGRAM(s) IntraMuscular once PRN Glucose LESS THAN 70 milligrams/deciliter        DIAGNOSTIC TESTS:     Echo 1/8/18: The left ventricle is mildly dilated. No left ventricular hypertrophy. There is mild global hypokinesis of the left ventricle. The left ventricular ejection fraction is estimated to be 40-45%  The left atrium is severely dilated.The right atrium is severely dilated. The right ventricle is not well visualized. There is moderate aortic valve thickening. There is mild aortic regurgitation.  There is mild mitral valve thickening and mild mitral annular calcification. There is modeate mitral regurgitation.  There is severe tricuspid regurgitation.There is mild pulmonary hypertension. The pulmonary artery systolic pressure is estimated to be 45 mmHg.  No aortic root dilatation.The inferior vena cava was not well visualized.There is no pericardial effusion.

## 2018-01-09 NOTE — PROGRESS NOTE ADULT - PROBLEM SELECTOR PLAN 2
Pt with atrial fibrillation on Xarelto 20mg daily, metoprolol succinate 25mg PO daily  - Hold Xarelto 20mg daily  - c/w home metoprolol succinate 25mg PO daily.  - EP consult for PPM interrogation.  Paced rhythm at 60 bpm.   Misinterpretation of the heart rate by the office ECG computer.  May need a faster paced beat for improving cardiac output.

## 2018-01-09 NOTE — PROGRESS NOTE ADULT - ASSESSMENT
81yo Mandarin-speaking male with CHF (EF 40-45% on echo), Afib on Xarelto, dual-chamber PPM, HTN, HLD, pulm HTN on Letairis, DM, depression, brain mass presenting with worsening RIBEIRO and LE edema admitted for acute on chronic CHF.

## 2018-01-09 NOTE — PHYSICAL THERAPY INITIAL EVALUATION ADULT - GAIT DEVIATIONS NOTED, PT EVAL
fairly steady, no loss of balance, +supp O2, minimal RIBEIRO SpO2 95%, patient's lips appeared to be blue -RN Beth informed and present

## 2018-01-10 DIAGNOSIS — G93.9 DISORDER OF BRAIN, UNSPECIFIED: ICD-10-CM

## 2018-01-10 LAB
ANION GAP SERPL CALC-SCNC: 11 MMOL/L — SIGNIFICANT CHANGE UP (ref 5–17)
BUN SERPL-MCNC: 29 MG/DL — HIGH (ref 7–23)
CALCIUM SERPL-MCNC: 9.2 MG/DL — SIGNIFICANT CHANGE UP (ref 8.4–10.5)
CHLORIDE SERPL-SCNC: 99 MMOL/L — SIGNIFICANT CHANGE UP (ref 96–108)
CO2 SERPL-SCNC: 33 MMOL/L — HIGH (ref 22–31)
CREAT SERPL-MCNC: 0.96 MG/DL — SIGNIFICANT CHANGE UP (ref 0.5–1.3)
GLUCOSE SERPL-MCNC: 137 MG/DL — HIGH (ref 70–99)
HCT VFR BLD CALC: 42.9 % — SIGNIFICANT CHANGE UP (ref 39–50)
HGB BLD-MCNC: 14 G/DL — SIGNIFICANT CHANGE UP (ref 13–17)
MAGNESIUM SERPL-MCNC: 2.2 MG/DL — SIGNIFICANT CHANGE UP (ref 1.6–2.6)
MCHC RBC-ENTMCNC: 30.8 PG — SIGNIFICANT CHANGE UP (ref 27–34)
MCHC RBC-ENTMCNC: 32.6 G/DL — SIGNIFICANT CHANGE UP (ref 32–36)
MCV RBC AUTO: 94.3 FL — SIGNIFICANT CHANGE UP (ref 80–100)
PLATELET # BLD AUTO: 136 K/UL — LOW (ref 150–400)
POTASSIUM SERPL-MCNC: 3.7 MMOL/L — SIGNIFICANT CHANGE UP (ref 3.5–5.3)
POTASSIUM SERPL-SCNC: 3.7 MMOL/L — SIGNIFICANT CHANGE UP (ref 3.5–5.3)
RBC # BLD: 4.55 M/UL — SIGNIFICANT CHANGE UP (ref 4.2–5.8)
RBC # FLD: 13.6 % — SIGNIFICANT CHANGE UP (ref 10.3–16.9)
SODIUM SERPL-SCNC: 143 MMOL/L — SIGNIFICANT CHANGE UP (ref 135–145)
WBC # BLD: 4.5 K/UL — SIGNIFICANT CHANGE UP (ref 3.8–10.5)
WBC # FLD AUTO: 4.5 K/UL — SIGNIFICANT CHANGE UP (ref 3.8–10.5)

## 2018-01-10 PROCEDURE — 99233 SBSQ HOSP IP/OBS HIGH 50: CPT

## 2018-01-10 PROCEDURE — 71046 X-RAY EXAM CHEST 2 VIEWS: CPT | Mod: 26

## 2018-01-10 PROCEDURE — 70450 CT HEAD/BRAIN W/O DYE: CPT | Mod: 26

## 2018-01-10 RX ORDER — SIMETHICONE 80 MG/1
80 TABLET, CHEWABLE ORAL
Qty: 0 | Refills: 0 | Status: DISCONTINUED | OUTPATIENT
Start: 2018-01-10 | End: 2018-01-11

## 2018-01-10 RX ORDER — RIVAROXABAN 15 MG-20MG
20 KIT ORAL EVERY 24 HOURS
Qty: 0 | Refills: 0 | Status: DISCONTINUED | OUTPATIENT
Start: 2018-01-10 | End: 2018-01-11

## 2018-01-10 RX ORDER — POTASSIUM CHLORIDE 20 MEQ
40 PACKET (EA) ORAL ONCE
Qty: 0 | Refills: 0 | Status: COMPLETED | OUTPATIENT
Start: 2018-01-10 | End: 2018-01-10

## 2018-01-10 RX ADMIN — Medication 25 MILLIGRAM(S): at 06:32

## 2018-01-10 RX ADMIN — BUDESONIDE AND FORMOTEROL FUMARATE DIHYDRATE 2 PUFF(S): 160; 4.5 AEROSOL RESPIRATORY (INHALATION) at 06:32

## 2018-01-10 RX ADMIN — Medication 40 MILLIEQUIVALENT(S): at 11:22

## 2018-01-10 RX ADMIN — Medication 50 MILLIGRAM(S): at 22:11

## 2018-01-10 RX ADMIN — ATORVASTATIN CALCIUM 10 MILLIGRAM(S): 80 TABLET, FILM COATED ORAL at 22:11

## 2018-01-10 RX ADMIN — RIVAROXABAN 20 MILLIGRAM(S): KIT at 17:19

## 2018-01-10 RX ADMIN — ESCITALOPRAM OXALATE 20 MILLIGRAM(S): 10 TABLET, FILM COATED ORAL at 11:22

## 2018-01-10 RX ADMIN — FINASTERIDE 5 MILLIGRAM(S): 5 TABLET, FILM COATED ORAL at 11:23

## 2018-01-10 RX ADMIN — LISINOPRIL 2.5 MILLIGRAM(S): 2.5 TABLET ORAL at 11:23

## 2018-01-10 RX ADMIN — TAMSULOSIN HYDROCHLORIDE 0.4 MILLIGRAM(S): 0.4 CAPSULE ORAL at 22:11

## 2018-01-10 RX ADMIN — LATANOPROST 1 DROP(S): 0.05 SOLUTION/ DROPS OPHTHALMIC; TOPICAL at 22:11

## 2018-01-10 RX ADMIN — Medication 40 MILLIGRAM(S): at 06:32

## 2018-01-10 RX ADMIN — AMBRISENTAN 5 MILLIGRAM(S): 10 TABLET, FILM COATED ORAL at 11:21

## 2018-01-10 RX ADMIN — Medication 600 MILLIGRAM(S): at 11:22

## 2018-01-10 RX ADMIN — MONTELUKAST 10 MILLIGRAM(S): 4 TABLET, CHEWABLE ORAL at 11:22

## 2018-01-10 NOTE — PROGRESS NOTE ADULT - ATTENDING COMMENTS
Case reviewed and patient examined in Radiology while patient was waiting for his CT scan.  No evidence of distress or CHF.  I agree with the note and examination above.

## 2018-01-10 NOTE — PROGRESS NOTE ADULT - PROBLEM SELECTOR PLAN 6
continue Lasix and metoprolol succinate. continue Lasix and metoprolol succinate.  #Hyperlipidemia  - continue home atorvastatin 10mg daily and gemfibrozil 600mg daily  - LDL 72

## 2018-01-10 NOTE — PROGRESS NOTE ADULT - PROBLEM SELECTOR PLAN 1
Pt with known diastolic CHF (EF 65% on CTA cardiac), on alternating Lasix 40mg/20mg PO daily at home, s/p IV Lasix 40mg in ED. Trop peaked 0.03. . EKG afib w/ V-paced. On exam b/l LE edema, +JVD, and crackles L>R. CXR showing L sided pleural effusion. Etiology of acute systolic CHF exacerbation likely 2/2 URI/viral illness, chronic RV pacing, vs ischemia.  - Monitor strict I/Os, daily weight  - Transition to PO Lasix 40mg qd (home dose 40mg/20mg alternating qod)  -C/w Metoprolol succinate 25mg qd  -Started Lisinopril 2.5mg qd  - Echo performed w/ EF 40-45%, mild global hypokinesis, mildly dilated LV, severely dilated LA/RA, mod aortic valve thickening, mild AI, mod MR, sev TR, mild pulm HTN, PASP 45.  -CTA Cardiac 5/2017 w/ Calcium score 726, mild disease noted in the LAD and RCA, FFR >0.8.  -Etiology decreased EF possibly 2/2 chronic RV pacing, although ischemia remains in differential  -Due to significant decrease in LVEF from over 60% on CCTA to below 50% on Echo now and worsening RIBEIRO and CHF symptoms pending R and L heart cath today w/ Dr Farr. Pt with known diastolic CHF (EF 65% on CTA cardiac), on alternating Lasix 40mg/20mg PO daily at home, s/p IV Lasix 40mg in ED. Trop peaked 0.03. . EKG afib w/ V-paced. On exam b/l LE edema, +JVD, and crackles L>R. CXR showing L sided pleural effusion. Etiology of acute systolic CHF exacerbation likely 2/2 URI/viral illness, chronic RV pacing, vs ischemia.  - Monitor strict I/Os, daily weight  - Transition to PO Lasix 40mg qd (home dose 40mg/20mg alternating qod)  -C/w Metoprolol succinate 25mg qd  -Started Lisinopril 2.5mg qd  - Echo performed w/ EF 40-45%, mild global hypokinesis, mildly dilated LV, severely dilated LA/RA, mod aortic valve thickening, mild AI, mod MR, sev TR, mild pulm HTN, PASP 45.  -CTA Cardiac 5/2017 w/ Calcium score 726, mild disease noted in the LAD and RCA, FFR >0.8.  -Etiology decreased EF possibly 2/2 chronic RV pacing, although ischemia remains in differential  -Due to significant decrease in LVEF from over 60% on CCTA to below 50% on Echo now and worsening RIBEIRO and CHF symptoms S/p L/RHC.  -Cardial cath [facility] 1/9/18 revealed nonobstructive CAD (LAD 30% stenosis, luminal irregularities LAD/LCx. RHC: RA 13, RV 40/13, PA 40/16, PCWP 16, PA sat 60, Ao sat 86.

## 2018-01-10 NOTE — PROGRESS NOTE ADULT - SUBJECTIVE AND OBJECTIVE BOX
CARDIOLOGY NP PROGRESS NOTE    Subjective: Pt seen and examined at bedside. Reports ongoing dizziness x 1 month and decreased vision x few weeks. Denies chest pain, sob, lightheadedness.  Remainder ROS otherwise negative.    Overnight Events:     TELEMETRY: V Paced 60        VITAL SIGNS:  T(C): 36.4 (01-10-18 @ 05:29), Max: 36.6 (01-09-18 @ 18:12)  HR: 60 (01-10-18 @ 10:05) (60 - 66)  BP: 123/63 (01-10-18 @ 10:05) (111/55 - 145/64)  RR: 17 (01-10-18 @ 10:05) (15 - 18)  SpO2: 93% (01-10-18 @ 10:05) (90% - 96%)  Wt(kg): --    I&O's Summary    09 Jan 2018 07:01  -  10 Phill 2018 07:00  --------------------------------------------------------  IN: 0 mL / OUT: 1900 mL / NET: -1900 mL    10 Phill 2018 07:01  -  10 Phill 2018 12:01  --------------------------------------------------------  IN: 0 mL / OUT: 600 mL / NET: -600 mL          PHYSICAL EXAM:    General: A/ox 3, No acute Distress  Neck: Supple, no JVD  Cardiac: S1 S2, grade 2 systolic murmur  Pulmonary: Lungs bibasilar crackles, Breathing unlabored on 2L, No Rhonchi/Wheezing  Abdomen: Soft, Non -tender, +BS x 4 quads  Extremities: No Rashes, 2+ pedal-ankle edema  Neuro: A/o x 3, No focal deficits          LABS:                          14.0   4.5   )-----------( 136      ( 10 Phill 2018 07:37 )             42.9                              01-10    143  |  99  |  29<H>  ----------------------------<  137<H>  3.7   |  33<H>  |  0.96    Ca    9.2      10 Phill 2018 07:37  Mg     2.2     01-10      PT/INR - ( 09 Jan 2018 09:12 )   PT: 16.3 sec;   INR: 1.46          PTT - ( 09 Jan 2018 09:12 )  PTT:48.4 sec  CAPILLARY BLOOD GLUCOSE      POCT Blood Glucose.: 186 mg/dL (10 Phill 2018 11:35)  POCT Blood Glucose.: 127 mg/dL (10 Phill 2018 06:10)  POCT Blood Glucose.: 129 mg/dL (09 Jan 2018 21:54)  POCT Blood Glucose.: 178 mg/dL (09 Jan 2018 16:08)  POCT Blood Glucose.: 127 mg/dL (09 Jan 2018 14:17)            Allergies:  No Known Allergies    MEDICATIONS  (STANDING):  ambrisentan 5 milliGRAM(s) Oral daily  atorvastatin 10 milliGRAM(s) Oral at bedtime  buDESOnide 160 MICROgram(s)/formoterol 4.5 MICROgram(s) Inhaler 2 Puff(s) Inhalation two times a day  dextrose 5%. 1000 milliLiter(s) (50 mL/Hr) IV Continuous <Continuous>  dextrose 50% Injectable 12.5 Gram(s) IV Push once  dextrose 50% Injectable 25 Gram(s) IV Push once  dextrose 50% Injectable 25 Gram(s) IV Push once  escitalopram 20 milliGRAM(s) Oral daily  finasteride 5 milliGRAM(s) Oral daily  furosemide    Tablet 40 milliGRAM(s) Oral daily  gemfibrozil 600 milliGRAM(s) Oral daily  insulin lispro (HumaLOG) corrective regimen sliding scale   SubCutaneous Before meals and at bedtime  latanoprost 0.005% Ophthalmic Solution 1 Drop(s) Both EYES at bedtime  lisinopril 2.5 milliGRAM(s) Oral daily  metoprolol succinate ER 25 milliGRAM(s) Oral daily  montelukast 10 milliGRAM(s) Oral daily  rivaroxaban 20 milliGRAM(s) Oral every 24 hours  tamsulosin 0.4 milliGRAM(s) Oral at bedtime  traZODone 50 milliGRAM(s) Oral at bedtime    MEDICATIONS  (PRN):  dextrose Gel 1 Dose(s) Oral once PRN Blood Glucose LESS THAN 70 milliGRAM(s)/deciliter  glucagon  Injectable 1 milliGRAM(s) IntraMuscular once PRN Glucose LESS THAN 70 milligrams/deciliter        DIAGNOSTIC TESTS: CARDIOLOGY NP PROGRESS NOTE    Subjective: Pt seen and examined at bedside. Reports ongoing dizziness x 1 month and decreased vision x few weeks. Denies chest pain, sob, lightheadedness.  Remainder ROS otherwise negative.    Overnight Events: S/p cardiac cath yesterday w/ nonobstructive CAD, elevated wedge pressure 16.    TELEMETRY: V Paced 60        VITAL SIGNS:  T(C): 36.4 (01-10-18 @ 05:29), Max: 36.6 (01-09-18 @ 18:12)  HR: 60 (01-10-18 @ 10:05) (60 - 66)  BP: 123/63 (01-10-18 @ 10:05) (111/55 - 145/64)  RR: 17 (01-10-18 @ 10:05) (15 - 18)  SpO2: 93% (01-10-18 @ 10:05) (90% - 96%)  Wt(kg): --    I&O's Summary    09 Jan 2018 07:01  -  10 Phill 2018 07:00  --------------------------------------------------------  IN: 0 mL / OUT: 1900 mL / NET: -1900 mL    10 Phill 2018 07:01  -  10 Phill 2018 12:01  --------------------------------------------------------  IN: 0 mL / OUT: 600 mL / NET: -600 mL          PHYSICAL EXAM:    General: A/ox 3, No acute Distress  Neck: Supple, no JVD  Cardiac: S1 S2, grade 2 systolic murmur  Pulmonary: Lungs bibasilar crackles, Breathing unlabored on 2L, No Rhonchi/Wheezing  Abdomen: Soft, Non -tender, +BS x 4 quads  Extremities: No Rashes, 2+ pedal-ankle edema  Neuro: A/o x 3, No focal deficits          LABS:                          14.0   4.5   )-----------( 136      ( 10 Phill 2018 07:37 )             42.9                              01-10    143  |  99  |  29<H>  ----------------------------<  137<H>  3.7   |  33<H>  |  0.96    Ca    9.2      10 Phill 2018 07:37  Mg     2.2     01-10      PT/INR - ( 09 Jan 2018 09:12 )   PT: 16.3 sec;   INR: 1.46          PTT - ( 09 Jan 2018 09:12 )  PTT:48.4 sec  CAPILLARY BLOOD GLUCOSE      POCT Blood Glucose.: 186 mg/dL (10 Phill 2018 11:35)  POCT Blood Glucose.: 127 mg/dL (10 Phill 2018 06:10)  POCT Blood Glucose.: 129 mg/dL (09 Jan 2018 21:54)  POCT Blood Glucose.: 178 mg/dL (09 Jan 2018 16:08)  POCT Blood Glucose.: 127 mg/dL (09 Jan 2018 14:17)            Allergies:  No Known Allergies    MEDICATIONS  (STANDING):  ambrisentan 5 milliGRAM(s) Oral daily  atorvastatin 10 milliGRAM(s) Oral at bedtime  buDESOnide 160 MICROgram(s)/formoterol 4.5 MICROgram(s) Inhaler 2 Puff(s) Inhalation two times a day  dextrose 5%. 1000 milliLiter(s) (50 mL/Hr) IV Continuous <Continuous>  dextrose 50% Injectable 12.5 Gram(s) IV Push once  dextrose 50% Injectable 25 Gram(s) IV Push once  dextrose 50% Injectable 25 Gram(s) IV Push once  escitalopram 20 milliGRAM(s) Oral daily  finasteride 5 milliGRAM(s) Oral daily  furosemide    Tablet 40 milliGRAM(s) Oral daily  gemfibrozil 600 milliGRAM(s) Oral daily  insulin lispro (HumaLOG) corrective regimen sliding scale   SubCutaneous Before meals and at bedtime  latanoprost 0.005% Ophthalmic Solution 1 Drop(s) Both EYES at bedtime  lisinopril 2.5 milliGRAM(s) Oral daily  metoprolol succinate ER 25 milliGRAM(s) Oral daily  montelukast 10 milliGRAM(s) Oral daily  rivaroxaban 20 milliGRAM(s) Oral every 24 hours  tamsulosin 0.4 milliGRAM(s) Oral at bedtime  traZODone 50 milliGRAM(s) Oral at bedtime    MEDICATIONS  (PRN):  dextrose Gel 1 Dose(s) Oral once PRN Blood Glucose LESS THAN 70 milliGRAM(s)/deciliter  glucagon  Injectable 1 milliGRAM(s) IntraMuscular once PRN Glucose LESS THAN 70 milligrams/deciliter        DIAGNOSTIC TESTS:

## 2018-01-10 NOTE — PROGRESS NOTE ADULT - PROBLEM SELECTOR PLAN 8
- continue home Lexapro 20mg daily  - continue trazodone 50mg daily at bedtime.

## 2018-01-10 NOTE — PROGRESS NOTE ADULT - PROBLEM SELECTOR PLAN 2
Pt with atrial fibrillation on Xarelto 20mg daily, metoprolol succinate 25mg PO daily  - Resume Xarelto 20mg daily  - c/w home metoprolol succinate 25mg PO daily.  - EP consult for PPM interrogation.  Paced rhythm at 60 bpm.   Misinterpretation of the heart rate by the office ECG computer.  May need a faster paced beat for improving cardiac output. Pt with atrial fibrillation on Xarelto 20mg daily, metoprolol succinate 25mg PO daily  - Resume Xarelto 20mg daily  - c/w home metoprolol succinate 25mg PO daily.  - EP consult for [employer] PPM interrogation, increased PPM pacing rate to 70 bpm per Dr Joseph as pt may need a faster paced beat for improving cardiac output.

## 2018-01-10 NOTE — PROGRESS NOTE ADULT - PROBLEM SELECTOR PROBLEM 1
Acute on chronic congestive heart failure, unspecified congestive heart failure type

## 2018-01-10 NOTE — PROGRESS NOTE ADULT - PROBLEM SELECTOR PROBLEM 5
DM2 (diabetes mellitus, type 2)

## 2018-01-10 NOTE — PROGRESS NOTE ADULT - ASSESSMENT
83yo Mandarin-speaking male with CHF (EF 40-45% on echo), Afib on Xarelto, dual-chamber PPM, HTN, HLD, pulm HTN on Letairis, DM, depression, brain mass presenting with worsening RIBEIRO and LE edema admitted for acute on chronic CHF. 81yo Mandarin-speaking male with CHF (EF 40-45% on echo), Afib on Xarelto, dual-chamber PPM, HTN, HLD, pulm HTN on Letairis, DM, depression, brain mass presenting with worsening RIBEIRO and LE edema admitted for acute on chronic CHF exacerbation.

## 2018-01-10 NOTE — PROGRESS NOTE ADULT - PROBLEM SELECTOR PLAN 3
Troponin at 0.02 on admission, peaked at 0.03. Mildly elevated trop likely 2/2 acute on chronic CHF.  -EKG uninterpretable as is Vpaced  -pending R/[facility] today w/ Dr Farr Troponin at 0.02 on admission, peaked at 0.03. Mildly elevated trop likely 2/2 acute on chronic CHF.  -EKG uninterpretable as is Vpaced  -S/p cardiac cath revealing nonobstructive CAD

## 2018-01-10 NOTE — PROGRESS NOTE ADULT - PROBLEM SELECTOR PLAN 7
- continue home atorvastatin 10mg daily and gemfibrozil 600mg daily  - LDL 72 Hx brain mass being monitored x5yrs. Pt reports dizziness x 1 month, double vision x 2 weeks.  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 If there is further clinical concern, MRI of the sella is recommended. Tiny left basal ganglia lacunar infarct, age indeterminate. Mild microvascular disease. No acute intracranial injury. 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 Pt reports dizziness x 1 month, double vision x 2 weeks.  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 If there is further clinical concern, MRI of the sella is recommended. Tiny left basal ganglia lacunar infarct, age indeterminate. Mild microvascular disease. No acute intracranial injury.

## 2018-01-10 NOTE — PROGRESS NOTE ADULT - PROBLEM SELECTOR PLAN 4
Pt with severe pulmonary HTN as per outpatient notes. Takes Letairis 5mg PO daily at home which is not available on formulary   -Resumed outpt Letaris 5mg qd as family brought mediation from home.  His PH could be due to left sided heart disease.  Will reevaluate after the R and L heart cath.   -Echo noted mild pulm HTN, PASP 45 Pt with severe pulmonary HTN as per outpatient notes. Takes Letairis 5mg PO daily at home which is not available on formulary   -Resumed outpt Letaris 5mg qd as family brought mediation from home.  His PH could be due to left sided heart disease.  -Echo noted mild pulm HTN, PASP 45 Pt with severe pulmonary HTN as per outpatient notes. Takes Letairis 5mg PO daily at home which is not available on formulary   -Resumed outpt Letaris 5mg qd as family brought mediation from home.  -Echo noted mild pulm HTN, PASP 45.   -Cardiac cath noted mild pulm HTN. RHC: RA 13, RV 40/13, PA 40/16, PCWP 16, PA sat 60, Ao sat 86.  -Will likely need pulmonary outpt pulm HTN workup

## 2018-01-11 VITALS — TEMPERATURE: 98 F

## 2018-01-11 LAB
ANION GAP SERPL CALC-SCNC: 11 MMOL/L — SIGNIFICANT CHANGE UP (ref 5–17)
BUN SERPL-MCNC: 29 MG/DL — HIGH (ref 7–23)
CALCIUM SERPL-MCNC: 9.1 MG/DL — SIGNIFICANT CHANGE UP (ref 8.4–10.5)
CHLORIDE SERPL-SCNC: 103 MMOL/L — SIGNIFICANT CHANGE UP (ref 96–108)
CO2 SERPL-SCNC: 30 MMOL/L — SIGNIFICANT CHANGE UP (ref 22–31)
CREAT SERPL-MCNC: 0.93 MG/DL — SIGNIFICANT CHANGE UP (ref 0.5–1.3)
GLUCOSE SERPL-MCNC: 141 MG/DL — HIGH (ref 70–99)
HCT VFR BLD CALC: 44 % — SIGNIFICANT CHANGE UP (ref 39–50)
HGB BLD-MCNC: 14.1 G/DL — SIGNIFICANT CHANGE UP (ref 13–17)
MAGNESIUM SERPL-MCNC: 2.1 MG/DL — SIGNIFICANT CHANGE UP (ref 1.6–2.6)
MCHC RBC-ENTMCNC: 30.1 PG — SIGNIFICANT CHANGE UP (ref 27–34)
MCHC RBC-ENTMCNC: 32 G/DL — SIGNIFICANT CHANGE UP (ref 32–36)
MCV RBC AUTO: 94 FL — SIGNIFICANT CHANGE UP (ref 80–100)
PLATELET # BLD AUTO: 148 K/UL — LOW (ref 150–400)
POTASSIUM SERPL-MCNC: 4 MMOL/L — SIGNIFICANT CHANGE UP (ref 3.5–5.3)
POTASSIUM SERPL-SCNC: 4 MMOL/L — SIGNIFICANT CHANGE UP (ref 3.5–5.3)
RBC # BLD: 4.68 M/UL — SIGNIFICANT CHANGE UP (ref 4.2–5.8)
RBC # FLD: 13.5 % — SIGNIFICANT CHANGE UP (ref 10.3–16.9)
SODIUM SERPL-SCNC: 144 MMOL/L — SIGNIFICANT CHANGE UP (ref 135–145)
WBC # BLD: 3.9 K/UL — SIGNIFICANT CHANGE UP (ref 3.8–10.5)
WBC # FLD AUTO: 3.9 K/UL — SIGNIFICANT CHANGE UP (ref 3.8–10.5)

## 2018-01-11 PROCEDURE — 70450 CT HEAD/BRAIN W/O DYE: CPT

## 2018-01-11 PROCEDURE — C1769: CPT

## 2018-01-11 PROCEDURE — 83735 ASSAY OF MAGNESIUM: CPT

## 2018-01-11 PROCEDURE — 85730 THROMBOPLASTIN TIME PARTIAL: CPT

## 2018-01-11 PROCEDURE — C1887: CPT

## 2018-01-11 PROCEDURE — C8929: CPT

## 2018-01-11 PROCEDURE — 84100 ASSAY OF PHOSPHORUS: CPT

## 2018-01-11 PROCEDURE — 83880 ASSAY OF NATRIURETIC PEPTIDE: CPT

## 2018-01-11 PROCEDURE — 85610 PROTHROMBIN TIME: CPT

## 2018-01-11 PROCEDURE — 71046 X-RAY EXAM CHEST 2 VIEWS: CPT

## 2018-01-11 PROCEDURE — 80053 COMPREHEN METABOLIC PANEL: CPT

## 2018-01-11 PROCEDURE — 85027 COMPLETE CBC AUTOMATED: CPT

## 2018-01-11 PROCEDURE — C1889: CPT

## 2018-01-11 PROCEDURE — 87486 CHLMYD PNEUM DNA AMP PROBE: CPT

## 2018-01-11 PROCEDURE — 80048 BASIC METABOLIC PNL TOTAL CA: CPT

## 2018-01-11 PROCEDURE — 87798 DETECT AGENT NOS DNA AMP: CPT

## 2018-01-11 PROCEDURE — 99285 EMERGENCY DEPT VISIT HI MDM: CPT | Mod: 25

## 2018-01-11 PROCEDURE — 82962 GLUCOSE BLOOD TEST: CPT

## 2018-01-11 PROCEDURE — 85025 COMPLETE CBC W/AUTO DIFF WBC: CPT

## 2018-01-11 PROCEDURE — 83036 HEMOGLOBIN GLYCOSYLATED A1C: CPT

## 2018-01-11 PROCEDURE — 99233 SBSQ HOSP IP/OBS HIGH 50: CPT

## 2018-01-11 PROCEDURE — 80061 LIPID PANEL: CPT

## 2018-01-11 PROCEDURE — 93005 ELECTROCARDIOGRAM TRACING: CPT

## 2018-01-11 PROCEDURE — C1894: CPT

## 2018-01-11 PROCEDURE — 71045 X-RAY EXAM CHEST 1 VIEW: CPT

## 2018-01-11 PROCEDURE — 97161 PT EVAL LOW COMPLEX 20 MIN: CPT

## 2018-01-11 PROCEDURE — 87581 M.PNEUMON DNA AMP PROBE: CPT

## 2018-01-11 PROCEDURE — 87633 RESP VIRUS 12-25 TARGETS: CPT

## 2018-01-11 PROCEDURE — 36415 COLL VENOUS BLD VENIPUNCTURE: CPT

## 2018-01-11 PROCEDURE — 96374 THER/PROPH/DIAG INJ IV PUSH: CPT

## 2018-01-11 PROCEDURE — 94640 AIRWAY INHALATION TREATMENT: CPT

## 2018-01-11 PROCEDURE — 84484 ASSAY OF TROPONIN QUANT: CPT

## 2018-01-11 RX ORDER — FUROSEMIDE 40 MG
1 TABLET ORAL
Qty: 0 | Refills: 0 | COMMUNITY

## 2018-01-11 RX ORDER — TAMSULOSIN HYDROCHLORIDE 0.4 MG/1
0 CAPSULE ORAL
Qty: 0 | Refills: 0 | COMMUNITY

## 2018-01-11 RX ORDER — LISINOPRIL 2.5 MG/1
1 TABLET ORAL
Qty: 30 | Refills: 0 | OUTPATIENT
Start: 2018-01-11 | End: 2018-02-09

## 2018-01-11 RX ORDER — ESCITALOPRAM OXALATE 10 MG/1
1 TABLET, FILM COATED ORAL
Qty: 0 | Refills: 0 | COMMUNITY
Start: 2018-01-11

## 2018-01-11 RX ORDER — FUROSEMIDE 40 MG
0 TABLET ORAL
Qty: 0 | Refills: 0 | COMMUNITY

## 2018-01-11 RX ORDER — TAMSULOSIN HYDROCHLORIDE 0.4 MG/1
1 CAPSULE ORAL
Qty: 0 | Refills: 0 | COMMUNITY
Start: 2018-01-11

## 2018-01-11 RX ORDER — LATANOPROST 0.05 MG/ML
1 SOLUTION/ DROPS OPHTHALMIC; TOPICAL
Qty: 0 | Refills: 0 | COMMUNITY
Start: 2018-01-11

## 2018-01-11 RX ORDER — FINASTERIDE 5 MG/1
1 TABLET, FILM COATED ORAL
Qty: 0 | Refills: 0 | COMMUNITY
Start: 2018-01-11

## 2018-01-11 RX ORDER — LATANOPROST 0.05 MG/ML
0 SOLUTION/ DROPS OPHTHALMIC; TOPICAL
Qty: 0 | Refills: 0 | COMMUNITY

## 2018-01-11 RX ORDER — FINASTERIDE 5 MG/1
0 TABLET, FILM COATED ORAL
Qty: 0 | Refills: 0 | COMMUNITY

## 2018-01-11 RX ORDER — ESCITALOPRAM OXALATE 10 MG/1
0 TABLET, FILM COATED ORAL
Qty: 0 | Refills: 0 | COMMUNITY

## 2018-01-11 RX ADMIN — ESCITALOPRAM OXALATE 20 MILLIGRAM(S): 10 TABLET, FILM COATED ORAL at 10:55

## 2018-01-11 RX ADMIN — AMBRISENTAN 5 MILLIGRAM(S): 10 TABLET, FILM COATED ORAL at 10:55

## 2018-01-11 RX ADMIN — Medication 600 MILLIGRAM(S): at 10:55

## 2018-01-11 RX ADMIN — LISINOPRIL 2.5 MILLIGRAM(S): 2.5 TABLET ORAL at 06:31

## 2018-01-11 RX ADMIN — SIMETHICONE 80 MILLIGRAM(S): 80 TABLET, CHEWABLE ORAL at 10:56

## 2018-01-11 RX ADMIN — RIVAROXABAN 20 MILLIGRAM(S): KIT at 16:52

## 2018-01-11 RX ADMIN — FINASTERIDE 5 MILLIGRAM(S): 5 TABLET, FILM COATED ORAL at 10:55

## 2018-01-11 RX ADMIN — MONTELUKAST 10 MILLIGRAM(S): 4 TABLET, CHEWABLE ORAL at 10:57

## 2018-01-11 RX ADMIN — Medication 25 MILLIGRAM(S): at 06:31

## 2018-01-11 RX ADMIN — Medication 40 MILLIGRAM(S): at 06:31

## 2018-01-11 RX ADMIN — SIMETHICONE 80 MILLIGRAM(S): 80 TABLET, CHEWABLE ORAL at 06:31

## 2018-01-17 DIAGNOSIS — I50.23 ACUTE ON CHRONIC SYSTOLIC (CONGESTIVE) HEART FAILURE: ICD-10-CM

## 2018-01-17 DIAGNOSIS — Z79.84 LONG TERM (CURRENT) USE OF ORAL HYPOGLYCEMIC DRUGS: ICD-10-CM

## 2018-01-17 DIAGNOSIS — J06.9 ACUTE UPPER RESPIRATORY INFECTION, UNSPECIFIED: ICD-10-CM

## 2018-01-17 DIAGNOSIS — G93.9 DISORDER OF BRAIN, UNSPECIFIED: ICD-10-CM

## 2018-01-17 DIAGNOSIS — E78.5 HYPERLIPIDEMIA, UNSPECIFIED: ICD-10-CM

## 2018-01-17 DIAGNOSIS — E11.9 TYPE 2 DIABETES MELLITUS WITHOUT COMPLICATIONS: ICD-10-CM

## 2018-01-17 DIAGNOSIS — I34.0 NONRHEUMATIC MITRAL (VALVE) INSUFFICIENCY: ICD-10-CM

## 2018-01-17 DIAGNOSIS — I48.2 CHRONIC ATRIAL FIBRILLATION: ICD-10-CM

## 2018-01-17 DIAGNOSIS — I25.110 ATHEROSCLEROTIC HEART DISEASE OF NATIVE CORONARY ARTERY WITH UNSTABLE ANGINA PECTORIS: ICD-10-CM

## 2018-01-17 DIAGNOSIS — I11.0 HYPERTENSIVE HEART DISEASE WITH HEART FAILURE: ICD-10-CM

## 2018-01-17 DIAGNOSIS — Z79.01 LONG TERM (CURRENT) USE OF ANTICOAGULANTS: ICD-10-CM

## 2018-01-17 DIAGNOSIS — Z95.0 PRESENCE OF CARDIAC PACEMAKER: ICD-10-CM

## 2018-01-17 DIAGNOSIS — F32.9 MAJOR DEPRESSIVE DISORDER, SINGLE EPISODE, UNSPECIFIED: ICD-10-CM

## 2018-01-17 DIAGNOSIS — I27.20 PULMONARY HYPERTENSION, UNSPECIFIED: ICD-10-CM

## 2018-01-17 DIAGNOSIS — K21.9 GASTRO-ESOPHAGEAL REFLUX DISEASE WITHOUT ESOPHAGITIS: ICD-10-CM

## 2018-02-08 ENCOUNTER — NON-APPOINTMENT (OUTPATIENT)
Age: 83
End: 2018-02-08

## 2018-02-08 ENCOUNTER — APPOINTMENT (OUTPATIENT)
Dept: ELECTROPHYSIOLOGY | Facility: CLINIC | Age: 83
End: 2018-02-08
Payer: MEDICARE

## 2018-02-08 VITALS — HEART RATE: 73 BPM | SYSTOLIC BLOOD PRESSURE: 125 MMHG | DIASTOLIC BLOOD PRESSURE: 75 MMHG | OXYGEN SATURATION: 90 %

## 2018-02-08 DIAGNOSIS — I50.22 CHRONIC SYSTOLIC (CONGESTIVE) HEART FAILURE: ICD-10-CM

## 2018-02-08 DIAGNOSIS — I48.91 UNSPECIFIED ATRIAL FIBRILLATION: ICD-10-CM

## 2018-02-08 PROCEDURE — 99215 OFFICE O/P EST HI 40 MIN: CPT

## 2018-02-08 PROCEDURE — 93280 PM DEVICE PROGR EVAL DUAL: CPT

## 2018-02-08 RX ORDER — GLIMEPIRIDE 1 MG/1
1 TABLET ORAL DAILY
Refills: 0 | Status: ACTIVE | COMMUNITY

## 2018-02-08 RX ORDER — ICOSAPENT ETHYL 1000 MG/1
1 CAPSULE ORAL
Refills: 0 | Status: ACTIVE | COMMUNITY

## 2018-02-08 RX ORDER — FINASTERIDE 5 MG/1
5 TABLET, FILM COATED ORAL
Qty: 90 | Refills: 3 | Status: ACTIVE | COMMUNITY

## 2018-02-08 RX ORDER — ATORVASTATIN CALCIUM 10 MG/1
10 TABLET, FILM COATED ORAL
Qty: 30 | Refills: 11 | Status: ACTIVE | COMMUNITY

## 2018-02-08 RX ORDER — MONTELUKAST SODIUM 10 MG/1
10 TABLET, FILM COATED ORAL
Refills: 0 | Status: ACTIVE | COMMUNITY

## 2018-02-08 RX ORDER — ESCITALOPRAM OXALATE 20 MG/1
20 TABLET ORAL DAILY
Refills: 0 | Status: ACTIVE | COMMUNITY

## 2018-02-08 RX ORDER — TRAZODONE HYDROCHLORIDE 50 MG/1
50 TABLET ORAL
Refills: 0 | Status: ACTIVE | COMMUNITY

## 2018-02-08 RX ORDER — TAMSULOSIN HYDROCHLORIDE 0.4 MG/1
0.4 CAPSULE ORAL
Refills: 0 | Status: ACTIVE | COMMUNITY

## 2018-02-08 RX ORDER — FUROSEMIDE 40 MG/1
40 TABLET ORAL
Refills: 0 | Status: ACTIVE | COMMUNITY

## 2018-02-08 RX ORDER — LISINOPRIL 2.5 MG/1
2.5 TABLET ORAL
Qty: 30 | Refills: 0 | Status: ACTIVE | COMMUNITY

## 2018-02-08 RX ORDER — LATANOPROST/PF 0.005 %
0.01 DROPS OPHTHALMIC (EYE)
Refills: 0 | Status: ACTIVE | COMMUNITY

## 2018-02-08 RX ORDER — GEMFIBROZIL 600 MG/1
600 TABLET, FILM COATED ORAL DAILY
Qty: 30 | Refills: 6 | Status: ACTIVE | COMMUNITY

## 2018-02-08 RX ORDER — METOPROLOL SUCCINATE 25 MG/1
25 TABLET, EXTENDED RELEASE ORAL DAILY
Qty: 30 | Refills: 3 | Status: ACTIVE | COMMUNITY

## 2018-02-08 RX ORDER — BUDESONIDE AND FORMOTEROL FUMARATE DIHYDRATE 160; 4.5 UG/1; UG/1
160-4.5 AEROSOL RESPIRATORY (INHALATION)
Refills: 0 | Status: ACTIVE | COMMUNITY

## 2018-04-26 ENCOUNTER — APPOINTMENT (OUTPATIENT)
Dept: ELECTROPHYSIOLOGY | Facility: CLINIC | Age: 83
End: 2018-04-26

## 2019-08-28 PROBLEM — G93.9 DISORDER OF BRAIN, UNSPECIFIED: Chronic | Status: ACTIVE | Noted: 2018-01-06

## 2019-08-30 ENCOUNTER — APPOINTMENT (OUTPATIENT)
Dept: ULTRASOUND IMAGING | Facility: HOSPITAL | Age: 84
End: 2019-08-30

## 2019-08-30 ENCOUNTER — OUTPATIENT (OUTPATIENT)
Dept: OUTPATIENT SERVICES | Facility: HOSPITAL | Age: 84
LOS: 1 days | End: 2019-08-30
Payer: MEDICARE

## 2019-08-30 PROCEDURE — 76700 US EXAM ABDOM COMPLETE: CPT

## 2019-08-30 PROCEDURE — 76700 US EXAM ABDOM COMPLETE: CPT | Mod: 26

## 2020-11-19 NOTE — PHYSICAL THERAPY INITIAL EVALUATION ADULT - GAIT DISTANCE, PT EVAL
Patient given depo on right deltoid, tolerated well. Next depo due 2/10/21. Patient given flu shot on left deltoid, per pt request.    x2 with supp O2/50 feet

## 2024-09-07 NOTE — PHYSICAL THERAPY INITIAL EVALUATION ADULT - MANUAL MUSCLE TESTING RESULTS, REHAB EVAL
Ortho boot and walker  Ice  Limit ambulation for now  Close follow-up with orthopedics as soon as possible in 1 to 3 days  ER if worse    Fosfomycin  Push fluids.  Rest.  Follow-up PMD in 1-3d to recheck.  Go to the ER if you experience fever, vomiting, or flank pain.     Antibiotic side effects discussed and understood, including diarrhea, rash, life-threatening allergic reactions, and severe skin reactions.  Pt understands and accepts these risks and agrees to follow-up immediately should these symptoms occur.      >3+/5
